# Patient Record
Sex: FEMALE | Race: WHITE | NOT HISPANIC OR LATINO | Employment: UNEMPLOYED | URBAN - METROPOLITAN AREA
[De-identification: names, ages, dates, MRNs, and addresses within clinical notes are randomized per-mention and may not be internally consistent; named-entity substitution may affect disease eponyms.]

---

## 2017-05-17 ENCOUNTER — INITIAL PRENATAL (OUTPATIENT)
Dept: OBSTETRICS AND GYNECOLOGY | Facility: CLINIC | Age: 27
End: 2017-05-17
Payer: OTHER GOVERNMENT

## 2017-05-17 VITALS
SYSTOLIC BLOOD PRESSURE: 126 MMHG | DIASTOLIC BLOOD PRESSURE: 65 MMHG | BODY MASS INDEX: 27.86 KG/M2 | WEIGHT: 141.88 LBS | HEIGHT: 60 IN

## 2017-05-17 DIAGNOSIS — O09.291 HISTORY OF GESTATIONAL DIABETES IN PRIOR PREGNANCY, CURRENTLY PREGNANT, FIRST TRIMESTER: ICD-10-CM

## 2017-05-17 DIAGNOSIS — O34.219 PREGNANCY WITH HISTORY OF CESAREAN SECTION, ANTEPARTUM: ICD-10-CM

## 2017-05-17 DIAGNOSIS — Z86.32 HISTORY OF GESTATIONAL DIABETES IN PRIOR PREGNANCY, CURRENTLY PREGNANT, FIRST TRIMESTER: ICD-10-CM

## 2017-05-17 DIAGNOSIS — Z3A.01 LESS THAN 8 WEEKS GESTATION OF PREGNANCY: Primary | ICD-10-CM

## 2017-05-17 PROBLEM — O09.299 HISTORY OF GESTATIONAL DIABETES IN PRIOR PREGNANCY, CURRENTLY PREGNANT: Status: ACTIVE | Noted: 2017-05-17

## 2017-05-17 PROCEDURE — 99202 OFFICE O/P NEW SF 15 MIN: CPT | Mod: PBBFAC | Performed by: ADVANCED PRACTICE MIDWIFE

## 2017-05-17 PROCEDURE — 99202 OFFICE O/P NEW SF 15 MIN: CPT | Mod: S$PBB,,, | Performed by: ADVANCED PRACTICE MIDWIFE

## 2017-05-17 PROCEDURE — 99999 PR PBB SHADOW E&M-NEW PATIENT-LVL II: CPT | Mod: PBBFAC,,, | Performed by: ADVANCED PRACTICE MIDWIFE

## 2017-05-17 RX ORDER — LORATADINE 10 MG/1
10 TABLET ORAL DAILY
COMMUNITY

## 2017-05-17 NOTE — PROGRESS NOTES
Cydney Dixon is a 26 y.o. , presents today for amenorrhea.      C/C: amenorrhea, possible pregnancy    HPI: Reports amenorrhea since Patient's last menstrual period was 2017 (exact date).. Prior to LMP, menses were regular occuring every 28 days prior.  Current contraception  None, planned pregnancy .    + UPT on (2017). does report nausea . does not report breast tenderness. does not report vaginal bleeding since LMP. does not report long-term chronic medical conditions     PAP HISTORY: last pap , result Normal, does not have a history of abnormal paps      SOCIAL HISTORY: denies emotional/mental/physical/sexual violence or abuse. does  Feel safe at home. Accompanied today by .    Review of patient's allergies indicates:   Allergen Reactions    Sulfa (sulfonamide antibiotics) Hives     Past Medical History:   Diagnosis Date    Diabetes mellitus     gestational diabetes, diet controlled     Past Surgical History:   Procedure Laterality Date     SECTION       Past Surgical History:   Procedure Laterality Date     SECTION       OB History    Para Term  AB SAB TAB Ectopic Multiple Living   2 1 1       2      # Outcome Date GA Lbr Yury/2nd Weight Sex Delivery Anes PTL Lv   2 Term 12 38w0d  2.863 kg (6 lb 5 oz) M CS-LTranv Gen  Y      Complications: Fetal Intolerance,Gestational diabetes   1                  OB History      Para Term  AB TAB SAB Ectopic Multiple Living    2 1 1       2        Social History     Social History    Marital status:      Spouse name: N/A    Number of children: N/A    Years of education: N/A     Occupational History    Not on file.     Social History Main Topics    Smoking status: Never Smoker    Smokeless tobacco: Not on file    Alcohol use No      Comment: socially before pregnancy    Drug use: No    Sexual activity: Yes     Partners: Male     Birth control/ protection: None      Other Topics Concern    Not on file     Social History Narrative    No narrative on file     Family History   Problem Relation Age of Onset    Alzheimer's disease Paternal Grandmother     Stroke Maternal Grandfather     Diabetes Maternal Grandfather      History   Sexual Activity    Sexual activity: Yes    Partners: Male    Birth control/ protection: None       GENETIC SCREENING   Patient's age 35 years or older as of estimated date of delivery? No  Nural tube defect (meningomyelocele, spina bifida, or anencephaly)? No  Down syndrome? No  Juan-Sachs (Ashkenazi Anglican, Cajun, Cambodian [place])? No  Canavan disease (Ashkenazi Anglican)? No  Familial dysautonomia (Ashkenazi Anglican)? No  Sickle cell disease or trait ()? No  Hemophilia or other blood disorders? No  Cystic fibrosis? No  Muscular dystrophy? No  [place]'s chorea? No  Thalassemia (Italian, Greek, Mediterranean, or  background) MCV less than 80? No  Congenital heart defect? No  Mental retardation/autism? No   If Yes, was person tested for Fragile X? No  Other inherited genetic or chromosomal disorder? No  Maternal metabolic disorder (e.g. type 1 diabetes, PKU)? No  Patient or baby's father had a child with birth defects not listed above? No  Recurrent pregnancy loss or a stillbirth: No  Medications (including supplements, vitamins, herbs or OTC drugs)/illicit/recreational drugs/alcohol since last menstrual period? Claritin   If yes, agent(s) and strength/dose: prn  List any other genetic risks:   Comments/counseling:     INFECTION HISTORY  Live with someone with TB or exposed to TB: No  Patient or partner has history of genital herpes: No  Rash or viral illness since last menstrual period: No  Patient or partner has hepatitis B or C: No  History of STD, gonorrhea, chlamydia, HPV, HIV, syphilis (list all that apply): No  List other infections: No  Additional comments:     Primary Doctor No     ROS: Constitutional/Gen: Denies fevers,  chills, malaise, or weight loss. denies fatigue   Psych: Denies depression, anxiety  Eyes: Denies changes in vision or scotomata  Ears, nose, mouth, throat: Denies sinus tenderness, swelling, or dentition problems  CV/vasc: Denies heart palpitations or edema  Resp: Denies SOB or dyspnea  Breasts: Denies mass, nipple discharge, or trauma.  denies breast tenderness.  GI: pos mild constipation, diarrhea, no  vomiting.pos  nausea.  : Denies vaginal discharge, dysuria or pelvic pain.  pos urinary frequency  MS: Denies weakness, soreness, or changes in ROM    OBJECTIVE:  /65  Ht 5' (1.524 m)  Wt 64.4 kg (141 lb 13.9 oz)  LMP 2017 (Exact Date) Comment: 28 day cycle   BMI 27.71 kg/m2  Constitutional/Gen: NAD, appears stated age, oriented x 3  Neurologic: A&O x 4  Psych: affect appropriate and without signs of mood, thought or memory difficulty appreciated    UPT positive in office    ASSESSMENT:  26 y.o. female  with amenorrhea  Pregnancy confirmed  Likely at 7 w3d via LMP;Body mass index is 27.71 kg/(m^2).  Patient Active Problem List   Diagnosis    Pregnancy with history of  section, antepartum       PLAN:  7w3d  1. Amenorrhea  -- + UPT in office, Patient's last menstrual period was 2017 (exact date). --> Estimated Date of Delivery: 17  -- Dating US ordered  -- Anatomical US ordered  -- Routine  prenatal labs ordered    2. Body mass index is 27.71 kg/(m^2).  -- Discussed IOM recommended weight gain of:   Underweight Less than 18.5 28-40    Normal Weight 18.5-24.9  25-35    Overweight 25-29.9  15-25    Obese   30 and greater  11-20   -- Discussed criteria for delivery at [facility] r/t excessive pre-preg weight or excessive weight gain:   Pre-pregnancy BMI over 40 or excess pregnancy weight gain defined as:   Pre-preg BMI < 18.5; Excess weight gain = > 60 pound   Pre-preg BMI 18.5-24.9;  Excess weight gain = > 53 pounds   Pre-preg BMI 25-29.9;  Excess weight gain = > 38  pounds   Pre-preg BMI > 30;  Excess weight gain = > 30 pounds    3. Discussed nausea and vomiting in pregnancy  -- Education regarding lifestyle and dietary modifications  -- Reviewed use of B6/Unisom prn. Pt will notify us if no relief/worsening symptoms, will consider alternative therapies prn    4. Pregnancy education and counseling; handouts and booklet provided  -- Oriented to practice and anticipated prenatal course of care and how to contact us  -- Precautions/warning signs reviewed  -- Common complaints of pregnancy  -- Routine prenatal labs including HIV  -- Ultrasounds  -- Childbirth education/hospital/[facility] facilities  -- Nutrition, prepregnant BMI, and recommended weight gain  -- Toxoplasmosis precautions (Cats/Raw Meat)  -- Sexual activity and exercise  -- Environmental/Work hazards  -- Travel  -- Tobacco (Ask, Advise, Assess, Assist, and Arrange), as well as alcohol and drug use  -- Use of any medications (Including supplements, Vitamins, Herbs, or OTC Drugs)  -- Domestic violence screen neg    5. Reviewed genetic testing options. Reviewed available first trimester and/or second   trimester screening options. Reviewed risk of false positive/negative results and recommendation of referral to [facility] in event of a positive result, for NIPT, US, and/or amniocentesis. Reviewed the possible estimated 1 in 300/500 risk of miscarriage with amniocentesis, even with a healthy fetus. Patient does not desire genetic screening.     6. Hx previous c/s desires TOLAC: discussed expected plan of care  Hx diet controlled gestational diabetes with last pregnancy.  Early one hour ordered.    7. Reviewed warning signs, precautions, and how/when to contact us.       8. RTC 4 weeks, call or present sooner prn.     Updated Medication List:  Current Outpatient Prescriptions   Medication Sig Dispense Refill    loratadine (CLARITIN) 10 mg tablet Take 10 mg by mouth once daily.       No current facility-administered medications for  this visit.

## 2017-06-02 ENCOUNTER — PROCEDURE VISIT (OUTPATIENT)
Dept: OBSTETRICS AND GYNECOLOGY | Facility: CLINIC | Age: 27
End: 2017-06-02
Payer: OTHER GOVERNMENT

## 2017-06-02 DIAGNOSIS — Z36.89 ENCOUNTER TO ESTABLISH GESTATIONAL AGE USING ULTRASOUND: ICD-10-CM

## 2017-06-02 DIAGNOSIS — Z3A.01 LESS THAN 8 WEEKS GESTATION OF PREGNANCY: ICD-10-CM

## 2017-06-02 PROCEDURE — 76801 OB US < 14 WKS SINGLE FETUS: CPT | Mod: 26,S$PBB,, | Performed by: OBSTETRICS & GYNECOLOGY

## 2017-06-02 PROCEDURE — 76801 OB US < 14 WKS SINGLE FETUS: CPT | Mod: PBBFAC | Performed by: OBSTETRICS & GYNECOLOGY

## 2017-06-03 NOTE — PROCEDURES
Procedures     Indication  ========    Estimation of gestational age.    Method  ======    Transvaginal ultrasound examination. View: Sufficient.    Pregnancy  =========    Leigh pregnancy. Number of fetuses: 1.    Dating  ======    LMP on: 3/26/2017  GA by LMP 9 w + 5 d  AISF by LMP: 12/31/2017  Ultrasound examination on: 6/2/2017  GA by U/S based upon: CRL  GA by U/S 9 w + 4 d  ASIF by U/S: 1/1/2018  Assigned: Dating performed on 06/2/2017, based on the LMP  Assigned GA 9 w + 5 d  Assigned ASIF: 12/31/2017    Assessment  ==========    Gestational sac: visualized  Yolk sac: visualized  Embryo: visualized  CRL 27.4 mm 9w 4d Hadlock  Cardiac activity: present   bpm    Maternal Structures  ===============    Uterus / Cervix  Uterus: Normal  Ovaries / Tubes / Adnexa  Rt ovary: Normal  Rt ovary D1 2.4 cm  Rt ovary D2 2.1 cm  Rt ovary D3 2.3 cm  Rt ovary mean 2.3 cm  Rt ovary vol 6.2 cm³  Rt ovarian corpus luteum D1 22.0 mm  Rt ovarian corpus luteum D2 15.0 mm  Rt ovarian corpus luteum D3 17.0 mm  Lt ovary: Normal  Lt ovary D1 1.5 cm  Lt ovary D2 1.2 cm  Lt ovary D3 1.9 cm  Lt ovary mean 1.5 cm  Lt ovary vol 1.8 cm³  Pouch of Jose / Other Structures  Free fluid: No free fluid visualized    Impression  =========    Single viable intrauterine pregnancy consistent with LMP dating.  Embryo grossly WNL with normal cardiac activity.  Normal uterus, cervix and adnexae as noted above.  No fluid seen in cul-de-sac.    Recommendation  ==============    We recommend repeat evaluation for fetal growth and anatomy survey at 18-20 weeks.

## 2017-06-19 ENCOUNTER — INITIAL PRENATAL (OUTPATIENT)
Dept: OBSTETRICS AND GYNECOLOGY | Facility: CLINIC | Age: 27
End: 2017-06-19
Payer: OTHER GOVERNMENT

## 2017-06-19 VITALS — SYSTOLIC BLOOD PRESSURE: 98 MMHG | BODY MASS INDEX: 27.92 KG/M2 | DIASTOLIC BLOOD PRESSURE: 60 MMHG | WEIGHT: 142.94 LBS

## 2017-06-19 DIAGNOSIS — Z3A.12 12 WEEKS GESTATION OF PREGNANCY: ICD-10-CM

## 2017-06-19 DIAGNOSIS — Z34.91 PRENATAL CARE IN FIRST TRIMESTER: Primary | ICD-10-CM

## 2017-06-19 DIAGNOSIS — O34.219 PREGNANCY WITH HISTORY OF CESAREAN SECTION, ANTEPARTUM: ICD-10-CM

## 2017-06-19 PROCEDURE — 99999 PR PBB SHADOW E&M-EST. PATIENT-LVL II: CPT | Mod: PBBFAC,,, | Performed by: ADVANCED PRACTICE MIDWIFE

## 2017-06-19 PROCEDURE — 99212 OFFICE O/P EST SF 10 MIN: CPT | Mod: PBBFAC | Performed by: ADVANCED PRACTICE MIDWIFE

## 2017-06-19 PROCEDURE — 87591 N.GONORRHOEAE DNA AMP PROB: CPT

## 2017-06-19 PROCEDURE — 88175 CYTOPATH C/V AUTO FLUID REDO: CPT

## 2017-06-19 PROCEDURE — 87086 URINE CULTURE/COLONY COUNT: CPT

## 2017-06-19 PROCEDURE — 0500F INITIAL PRENATAL CARE VISIT: CPT | Mod: ,,, | Performed by: ADVANCED PRACTICE MIDWIFE

## 2017-06-19 NOTE — PROGRESS NOTES
27 y.o.  at 12w1d  Doing well, some acid reflux and sciatica   TW lbs , diet and exercise reviewed  No vaginal bleeding, no pain  Reviewed prenatal labs not done yet, will have done with early one hour  1st trimester genetic testing, pt requests cell free maternal serum screening as had done with last pregnancy.  [id number] pamphlet given to pt.  Reviewed with pt that she is not considered in a high risk group but she still states that her insurance will cover it and she desires it.  Will refer to [facility] for ordering.  PE today:  External genitalia: no lesions or discharge, normal hair distribution  Urethral meatus: normal size and location, no lesions or prolapse  Vagina: normal appearance, no lesions, no discharge,  Cervix: normal appearance, no discharge, no lesions, negative CMT  Uterus: nontender, mobile, approx 12 week size, normal contour and position. FHTs negative  Via doppler  Adnexa: no masses or tenderness  Anus/Perineum: normal appearance, with no lesions or discharge. Internal exam deferred.    Pap, gc/ct and urine culture today  Reviewed warning signs, pregnancy precautions and how/when to call.  RTC 4 wks, call or present sooner prn.

## 2017-06-20 ENCOUNTER — TELEPHONE (OUTPATIENT)
Dept: MATERNAL FETAL MEDICINE | Facility: CLINIC | Age: 27
End: 2017-06-20

## 2017-06-20 LAB
C TRACH DNA SPEC QL NAA+PROBE: NOT DETECTED
N GONORRHOEA DNA SPEC QL NAA+PROBE: NOT DETECTED

## 2017-06-21 LAB — BACTERIA UR CULT: NORMAL

## 2017-06-29 ENCOUNTER — LAB VISIT (OUTPATIENT)
Dept: LAB | Facility: OTHER | Age: 27
End: 2017-06-29
Attending: ADVANCED PRACTICE MIDWIFE
Payer: OTHER GOVERNMENT

## 2017-06-29 DIAGNOSIS — Z3A.01 LESS THAN 8 WEEKS GESTATION OF PREGNANCY: ICD-10-CM

## 2017-06-29 DIAGNOSIS — O09.291 HISTORY OF GESTATIONAL DIABETES IN PRIOR PREGNANCY, CURRENTLY PREGNANT, FIRST TRIMESTER: ICD-10-CM

## 2017-06-29 DIAGNOSIS — Z86.32 HISTORY OF GESTATIONAL DIABETES IN PRIOR PREGNANCY, CURRENTLY PREGNANT, FIRST TRIMESTER: ICD-10-CM

## 2017-06-29 LAB
ABO + RH BLD: NORMAL
BASOPHILS # BLD AUTO: 0.01 K/UL
BASOPHILS NFR BLD: 0.1 %
BLD GP AB SCN CELLS X3 SERPL QL: NORMAL
DIFFERENTIAL METHOD: NORMAL
EOSINOPHIL # BLD AUTO: 0.1 K/UL
EOSINOPHIL NFR BLD: 1.1 %
ERYTHROCYTE [DISTWIDTH] IN BLOOD BY AUTOMATED COUNT: 13.1 %
GLUCOSE SERPL-MCNC: 93 MG/DL
HBV SURFACE AG SERPL QL IA: NEGATIVE
HCT VFR BLD AUTO: 37.6 %
HGB BLD-MCNC: 12.9 G/DL
HIV 1+2 AB+HIV1 P24 AG SERPL QL IA: NEGATIVE
LYMPHOCYTES # BLD AUTO: 1.6 K/UL
LYMPHOCYTES NFR BLD: 19.4 %
MCH RBC QN AUTO: 30.6 PG
MCHC RBC AUTO-ENTMCNC: 34.3 %
MCV RBC AUTO: 89 FL
MONOCYTES # BLD AUTO: 0.5 K/UL
MONOCYTES NFR BLD: 6.4 %
NEUTROPHILS # BLD AUTO: 6.1 K/UL
NEUTROPHILS NFR BLD: 72.8 %
PLATELET # BLD AUTO: 211 K/UL
PMV BLD AUTO: 10.6 FL
RBC # BLD AUTO: 4.22 M/UL
RPR SER QL: NORMAL
WBC # BLD AUTO: 8.39 K/UL

## 2017-06-29 PROCEDURE — 86762 RUBELLA ANTIBODY: CPT

## 2017-06-29 PROCEDURE — 86900 BLOOD TYPING SEROLOGIC ABO: CPT

## 2017-06-29 PROCEDURE — 86703 HIV-1/HIV-2 1 RESULT ANTBDY: CPT

## 2017-06-29 PROCEDURE — 85025 COMPLETE CBC W/AUTO DIFF WBC: CPT

## 2017-06-29 PROCEDURE — 82950 GLUCOSE TEST: CPT

## 2017-06-29 PROCEDURE — 86901 BLOOD TYPING SEROLOGIC RH(D): CPT

## 2017-06-29 PROCEDURE — 86592 SYPHILIS TEST NON-TREP QUAL: CPT

## 2017-06-29 PROCEDURE — 87340 HEPATITIS B SURFACE AG IA: CPT

## 2017-06-29 PROCEDURE — 36415 COLL VENOUS BLD VENIPUNCTURE: CPT

## 2017-06-30 LAB
RUBV IGG SER-ACNC: 25.6 IU/ML
RUBV IGG SER-IMP: REACTIVE

## 2017-07-03 ENCOUNTER — HOSPITAL ENCOUNTER (EMERGENCY)
Facility: OTHER | Age: 27
Discharge: HOME | End: 2017-07-03
Attending: EMERGENCY MEDICINE
Payer: OTHER GOVERNMENT

## 2017-07-03 ENCOUNTER — NURSE TRIAGE (OUTPATIENT)
Dept: ADMINISTRATIVE | Facility: CLINIC | Age: 27
End: 2017-07-03

## 2017-07-03 ENCOUNTER — TELEPHONE (OUTPATIENT)
Dept: OBSTETRICS AND GYNECOLOGY | Facility: CLINIC | Age: 27
End: 2017-07-03

## 2017-07-03 VITALS
RESPIRATION RATE: 14 BRPM | WEIGHT: 140 LBS | TEMPERATURE: 98 F | BODY MASS INDEX: 27.48 KG/M2 | HEIGHT: 60 IN | SYSTOLIC BLOOD PRESSURE: 102 MMHG | DIASTOLIC BLOOD PRESSURE: 61 MMHG | HEART RATE: 79 BPM | OXYGEN SATURATION: 100 %

## 2017-07-03 DIAGNOSIS — S39.012A BACK STRAIN, INITIAL ENCOUNTER: ICD-10-CM

## 2017-07-03 DIAGNOSIS — O26.892 ABDOMINAL PAIN IN PREGNANCY, SECOND TRIMESTER: ICD-10-CM

## 2017-07-03 DIAGNOSIS — S80.02XA CONTUSION OF LEFT KNEE, INITIAL ENCOUNTER: Primary | ICD-10-CM

## 2017-07-03 DIAGNOSIS — R10.9 ABDOMINAL PAIN IN PREGNANCY, SECOND TRIMESTER: ICD-10-CM

## 2017-07-03 LAB
B-HCG UR QL: POSITIVE
BILIRUB UR QL STRIP: NEGATIVE
CLARITY UR: CLEAR
COLOR UR: YELLOW
CTP QC/QA: YES
GLUCOSE UR QL STRIP: NEGATIVE
HGB UR QL STRIP: NEGATIVE
KETONES UR QL STRIP: NEGATIVE
LEUKOCYTE ESTERASE UR QL STRIP: NEGATIVE
NITRITE UR QL STRIP: NEGATIVE
PH UR STRIP: 8 [PH] (ref 5–8)
PROT UR QL STRIP: NEGATIVE
SP GR UR STRIP: 1.01 (ref 1–1.03)
URN SPEC COLLECT METH UR: NORMAL
UROBILINOGEN UR STRIP-ACNC: NEGATIVE EU/DL

## 2017-07-03 PROCEDURE — 99283 EMERGENCY DEPT VISIT LOW MDM: CPT | Mod: 25

## 2017-07-03 PROCEDURE — 81003 URINALYSIS AUTO W/O SCOPE: CPT

## 2017-07-03 PROCEDURE — 81025 URINE PREGNANCY TEST: CPT | Performed by: PHYSICIAN ASSISTANT

## 2017-07-03 NOTE — TELEPHONE ENCOUNTER
Returned pt phone call.  Pt in waiting area in ER now, was told by triage nurse to go to ER if did not hear back from us by end of office hours.  Did have a fall last Friday although did not hit abdomen.  Since then has experienced progressively worsening cramping.  No bleeding.  No urinary sx.  Advised to go ahead and be seen by ER and to f/u with us as needed.

## 2017-07-03 NOTE — TELEPHONE ENCOUNTER
Returned pt phone call, no answer.  Left message asking pt to call back to after hours number especially if continues to feel pain or has any bleeding.

## 2017-07-03 NOTE — TELEPHONE ENCOUNTER
Pt has questions about pain she has been having since yesterday- she is unsure if it could just be ligament pain or if it is something she should be seen for since she had a hard fall Friday when she landed on her knees. Started on Sunday with intermittent lower abdominal pain( like menstrual cramping) lasting for 30-60 min then will resolve for about 2 hrs then return. Denied any bleeding/discharge.     Reason for Disposition   Intermittent lower abdominal pain lasting > 24 hours    Protocols used: ST PREGNANCY - ABDOMINAL PAIN LESS THAN 20 WEEKS EGA-A-OH    Advised I would send message to office requesting call back and advice. Pt instructed on ER if sx's worsening before speaking with someone from office.

## 2017-07-03 NOTE — ED TRIAGE NOTES
Patient is 14 weeks pregnant and fell several days ago after stepping on a toy at home.  Patient c/o lower back pain and lower abdominal pain.  Denies vaginal bleeding.  Patient in for evaluation.

## 2017-07-04 NOTE — ED PROVIDER NOTES
Encounter Date: 7/3/2017       History     Chief Complaint   Patient presents with    Fall     x3 days ago, subsequent lower back and lower abd pain. 14 wk pregnant approx,      27-year-old female presents to the ER for evaluation of low back pain and abdominal cramping ×3 days.  The patient is 14 weeks pregnant and is followed by our OB/GYN clinic.  The patient states that she tripped over her child's toy 3 days ago.  Her right leg slipped from underneath her and she fell directly on her left knee.  She did not fall on her abdomen or her back.  The patient was not experiencing any pain until the following day.  She reports mild low back pain and bilateral lower abdominal cramping pain beginning 2 days ago.  Her pain worsened today and she was advised by her OB/GYN clinic today to come to the ER for evaluation.  The patient has no current pain.  She says her abdominal cramping and back pain is intermittent.  Abdominal cramping is rated 5/10 when present and usually lasts for 30 minutes to an hour.  She has not taken any pain medications.  Her pain is unchanged with movements.  She denies nausea, vomiting, diarrhea, dysuria, vaginal bleeding or discharge, chest pain, shortness of breath or additional complaints at this time.          Review of patient's allergies indicates:   Allergen Reactions    Sulfa (sulfonamide antibiotics) Hives     Past Medical History:   Diagnosis Date    Diabetes mellitus     gestational diabetes, diet controlled     Past Surgical History:   Procedure Laterality Date     SECTION       Family History   Problem Relation Age of Onset    Alzheimer's disease Paternal Grandmother     Stroke Maternal Grandfather     Diabetes Maternal Grandfather      Social History   Substance Use Topics    Smoking status: Never Smoker    Smokeless tobacco: Not on file    Alcohol use No      Comment: socially before pregnancy     Review of Systems   Constitutional: Negative for chills and  fever.   HENT: Negative for sore throat.    Respiratory: Negative for shortness of breath.    Cardiovascular: Negative for chest pain.   Gastrointestinal: Positive for abdominal pain. Negative for nausea and vomiting.        Bedside ultrasound - fetal heart tones = 140, fetal movements observed   Genitourinary: Negative for dysuria, pelvic pain, vaginal bleeding and vaginal discharge.   Musculoskeletal: Positive for back pain.   Skin: Positive for wound (bruising left knee.  no abrasion). Negative for rash.   Neurological: Negative for dizziness, syncope, weakness and light-headedness.   Hematological: Does not bruise/bleed easily.       Physical Exam     Initial Vitals [07/03/17 1638]   BP Pulse Resp Temp SpO2   101/70 77 16 98.2 °F (36.8 °C) 98 %      MAP       80.33         Physical Exam    Constitutional: She appears well-developed and well-nourished.   HENT:   Head: Atraumatic.   Mouth/Throat: Oropharynx is clear and moist.   Eyes: Conjunctivae and EOM are normal. Pupils are equal, round, and reactive to light.   Neck: Normal range of motion. Neck supple.   Cardiovascular: Normal rate, regular rhythm and intact distal pulses.   Pulmonary/Chest: Breath sounds normal. No respiratory distress. She has no wheezes. She has no rhonchi. She has no rales.   Abdominal: Soft. Bowel sounds are normal. She exhibits no distension. There is no tenderness. There is no rebound and no guarding.   Bedside ultrasound - fetal heart tones = 140, fetal movements observed.    Musculoskeletal:        Left knee: She exhibits ecchymosis (light bruising to anterior knee). She exhibits normal range of motion, no swelling, no laceration, no erythema, no LCL laxity, no bony tenderness and no MCL laxity. No tenderness found.        Lumbar back: She exhibits tenderness (bilateral paraspinous muscles ) and pain. She exhibits no bony tenderness.   Neurological: She is alert and oriented to person, place, and time. She has normal strength. No  cranial nerve deficit.   Skin: No rash noted.   Psychiatric: She has a normal mood and affect.         ED Course   Procedures  Labs Reviewed   POCT URINE PREGNANCY - Abnormal; Notable for the following:        Result Value    POC Preg Test, Ur Positive (*)     All other components within normal limits   URINALYSIS                   APC / Resident Notes:   Patient presents to the ER chief complaint of abdominal cramping and low back pain, which began the day after a slip and fall.  The patient did not fall directly on her abdomen.  She reports history of intermittent back pain, and her back pain today is most consistent with a musculoskeletal back strain.  She has paraspinous muscular tenderness.  No midline spinous process tenderness, no radicular pain or extremity weakness or numbness.  I do not see an indication for emergent imaging at this time.  Patient's bedside ultrasound shows normal fetal heart tones and movements.  She is not experiencing any vaginal bleeding.  UA is negative for evidence of infection.  She has no recent vomiting, diarrhea or signs of dehydration.  The cause of her abdominal cramping is not clear at this time, but her Abdominal exam is benign and I do not suspect intraabdominal injury or acute abdominal infection such as appendicitis, cholecystitis or pancreatitis.    She is stable for discharge, but is advised to contact her OB/GYN for follow up exam this week.  She is given return precautions.  Patient is comfortable with this plan.I discussed the care of this patient with my supervising MD.                ED Course     Clinical Impression:   The primary encounter diagnosis was Contusion of left knee, initial encounter. Diagnoses of Back strain, initial encounter and Abdominal pain in pregnancy, second trimester were also pertinent to this visit.                           LOKESH Armstrong  07/04/17 0118       LOKESH Armstrong  07/04/17 0121

## 2017-07-17 ENCOUNTER — ROUTINE PRENATAL (OUTPATIENT)
Dept: OBSTETRICS AND GYNECOLOGY | Facility: CLINIC | Age: 27
End: 2017-07-17
Payer: OTHER GOVERNMENT

## 2017-07-17 VITALS
WEIGHT: 141.44 LBS | SYSTOLIC BLOOD PRESSURE: 102 MMHG | BODY MASS INDEX: 27.62 KG/M2 | DIASTOLIC BLOOD PRESSURE: 58 MMHG

## 2017-07-17 DIAGNOSIS — Z3A.16 16 WEEKS GESTATION OF PREGNANCY: ICD-10-CM

## 2017-07-17 DIAGNOSIS — Z34.80 ENCOUNTER FOR SUPERVISION OF OTHER NORMAL PREGNANCY: Primary | ICD-10-CM

## 2017-07-17 PROCEDURE — 99211 OFF/OP EST MAY X REQ PHY/QHP: CPT | Mod: PBBFAC | Performed by: ADVANCED PRACTICE MIDWIFE

## 2017-07-17 PROCEDURE — 99213 OFFICE O/P EST LOW 20 MIN: CPT | Mod: S$PBB,,, | Performed by: ADVANCED PRACTICE MIDWIFE

## 2017-07-17 PROCEDURE — 99999 PR PBB SHADOW E&M-EST. PATIENT-LVL I: CPT | Mod: PBBFAC,,, | Performed by: ADVANCED PRACTICE MIDWIFE

## 2017-08-17 ENCOUNTER — OFFICE VISIT (OUTPATIENT)
Dept: MATERNAL FETAL MEDICINE | Facility: CLINIC | Age: 27
End: 2017-08-17
Payer: OTHER GOVERNMENT

## 2017-08-17 ENCOUNTER — ROUTINE PRENATAL (OUTPATIENT)
Dept: OBSTETRICS AND GYNECOLOGY | Facility: CLINIC | Age: 27
End: 2017-08-17
Attending: OBSTETRICS & GYNECOLOGY
Payer: OTHER GOVERNMENT

## 2017-08-17 VITALS
WEIGHT: 144.06 LBS | BODY MASS INDEX: 28.14 KG/M2 | DIASTOLIC BLOOD PRESSURE: 72 MMHG | SYSTOLIC BLOOD PRESSURE: 110 MMHG

## 2017-08-17 DIAGNOSIS — O34.219 PREGNANCY WITH HISTORY OF CESAREAN SECTION, ANTEPARTUM: ICD-10-CM

## 2017-08-17 DIAGNOSIS — Z34.92 PRENATAL CARE IN SECOND TRIMESTER: ICD-10-CM

## 2017-08-17 DIAGNOSIS — Z3A.01 LESS THAN 8 WEEKS GESTATION OF PREGNANCY: ICD-10-CM

## 2017-08-17 DIAGNOSIS — Z36.89 ENCOUNTER FOR FETAL ANATOMIC SURVEY: ICD-10-CM

## 2017-08-17 DIAGNOSIS — M54.30 SCIATIC NERVE PAIN, UNSPECIFIED LATERALITY: Primary | ICD-10-CM

## 2017-08-17 PROBLEM — Z3A.12 12 WEEKS GESTATION OF PREGNANCY: Status: RESOLVED | Noted: 2017-05-17 | Resolved: 2017-08-17

## 2017-08-17 PROCEDURE — 0502F SUBSEQUENT PRENATAL CARE: CPT | Mod: ,,, | Performed by: ADVANCED PRACTICE MIDWIFE

## 2017-08-17 PROCEDURE — 99999 PR PBB SHADOW E&M-EST. PATIENT-LVL III: CPT | Mod: PBBFAC,,, | Performed by: ADVANCED PRACTICE MIDWIFE

## 2017-08-17 PROCEDURE — 76805 OB US >/= 14 WKS SNGL FETUS: CPT | Mod: PBBFAC | Performed by: OBSTETRICS & GYNECOLOGY

## 2017-08-17 PROCEDURE — 99499 UNLISTED E&M SERVICE: CPT | Mod: S$PBB,,, | Performed by: OBSTETRICS & GYNECOLOGY

## 2017-08-17 PROCEDURE — 76805 OB US >/= 14 WKS SNGL FETUS: CPT | Mod: 26,S$PBB,, | Performed by: OBSTETRICS & GYNECOLOGY

## 2017-08-17 NOTE — PROGRESS NOTES
OB Ultrasound Report (see PDF version under imaging tab)    Indication  ========    Fetal anatomy survey.    History  ======    Previous Outcomes   2  Para 1    Pregnancy History  ===============    Maternal Lab Tests  Genetic screening declined.      Method  ======    Transabdominal ultrasound examination. View: Suboptimal view: limited by fetal position.    Pregnancy  =========    Leigh pregnancy. Number of fetuses: 1.    Dating  ======    Ultrasound examination on: 2017  GA by U/S based upon: AC, BPD, Femur, HC  GA by U/S 19 w + 5 d  ASIF by U/S: 2018  Assigned: Dating performed on 2017, based on the LMP  Assigned GA 20 w + 4 d  Assigned ASIF: 2017    General Evaluation  ===============    Cardiac activity: present.  bpm.  Fetal movements: visualized.  Presentation: breech.  Placenta:  Placental site: posterior.  Umbilical cord: Cord vessels: 3 vessel cord. Cord insertion: placental insertion: normal.  Amniotic fluid: Amount of AF: normal amount.    Fetal Biometry  ===========    Fetal Biometry  BPD 42.2 mm 18w 5d Hadlock  OFD 61.4 mm 21w 1d Kristie  .3 mm 19w 3d Hadlock  .3 mm 20w 2d Hadlock  Femur 32.9 mm 20w 2d Hadlock  Cerebellum tr 20.5 mm 20w 2d Contreras  CM 3.7 mm  Humerus 33.7 mm 21w 3d Kristie   g 19% Jame  Calculated by: Hadlock (BPD-HC-AC-FL)  EFW (lb) 0 lb  EFW (oz) 12 oz  Cephalic index 0.69  HC / AC 1.11  FL / BPD 0.78  FL / AC 0.22   bpm  Head / Face / Neck   6.0 mm    Fetal Anatomy  ===========    Cranium: normal  Lateral ventricles: normal  Choroid plexus: normal  Midline falx: normal  Cavum septi pellucidi: normal  Cerebellum: normal  Cisterna magna: normal  Lips: normal  Profile: normal  Nose: normal  4-chamber view: suboptimal  RVOT: suboptimal  LVOT: suboptimal  4-chamber view: 4-chamber normal, septum suboptimal  Situs: normal  Aortic arch: normal  Ductal arch: suboptimal  3-vessel view: suboptimal  Cord  insertion: normal  Stomach: normal  Kidneys: normal  Bladder: normal  Genitals: normal  Cervical spine: normal  Thoracic spine: suboptimal  Lumbar spine: suboptimal  Sacral spine: normal  Arms: both visualized  Legs: both visualized  Rt upper arm: normal  Rt forearm: normal  Rt hand: visualized  Rt hand: open  Lt upper arm: normal  Lt forearm: normal  Lt hand: visualized  Lt hand: open  Rt upper leg: normal  Rt lower leg: normal  Rt foot: normal  Lt upper leg: normal  Lt lower leg: normal  Lt foot: normal  Position of feet: normal  Gender: female  Wants to know gender: yes    Maternal Structures  ===============    Uterus / Cervix  Cervical length 41.9 mm  Ovaries / Tubes / Adnexa  Rt ovary: Visualized  Lt ovary: Visualized    Impression  =========    A holland living IUP is identified. Fetal size is appropriate for established dating. No fetal structural malformations are identified;  however, the anatomic survey is incomplete as noted above. The patient will be scheduled for a f/u exam to complete the anatomic  survey. Cervical length is normal, and placental location is normal without evidence of previa.

## 2017-08-17 NOTE — PROGRESS NOTES
27 y.o. female  at 20w4d by LMP c/w 9 wk US  Starting to feel flutters/FM, denies VB, LOF or cramping  Requests referral to PT for sciatica discomfort - referral placed  Has anatomy US today  Declines genetic testing   Hx CS  -- Desires TOLAC. Will bring to next CNM/MD meeting  -- NRFS at 8cm; OP report in media section of EPIC - LTCS with 2 layer uterine closure  -- Understands TOLAC policies and is agreeable to them  Hx diet controlled GDM  -- Passed early 1hr this pregnancy  -- Understands need for repeat 1hr at ~ 28 wks  Reviewed warning signs, normal FM,  labor precautions and how/when to call.  RTC x 4 wks, call or present sooner prn.

## 2017-09-14 ENCOUNTER — DOCUMENTATION ONLY (OUTPATIENT)
Dept: OBSTETRICS AND GYNECOLOGY | Facility: CLINIC | Age: 27
End: 2017-09-14

## 2017-09-14 ENCOUNTER — OFFICE VISIT (OUTPATIENT)
Dept: MATERNAL FETAL MEDICINE | Facility: CLINIC | Age: 27
End: 2017-09-14
Payer: OTHER GOVERNMENT

## 2017-09-14 ENCOUNTER — ROUTINE PRENATAL (OUTPATIENT)
Dept: OBSTETRICS AND GYNECOLOGY | Facility: CLINIC | Age: 27
End: 2017-09-14
Payer: OTHER GOVERNMENT

## 2017-09-14 VITALS — BODY MASS INDEX: 28.8 KG/M2 | WEIGHT: 147.5 LBS | DIASTOLIC BLOOD PRESSURE: 62 MMHG | SYSTOLIC BLOOD PRESSURE: 100 MMHG

## 2017-09-14 DIAGNOSIS — O09.292 HISTORY OF GESTATIONAL DIABETES IN PRIOR PREGNANCY, CURRENTLY PREGNANT, SECOND TRIMESTER: ICD-10-CM

## 2017-09-14 DIAGNOSIS — Z34.92 PRENATAL CARE IN SECOND TRIMESTER: Primary | ICD-10-CM

## 2017-09-14 DIAGNOSIS — O34.219 PREGNANCY WITH HISTORY OF CESAREAN SECTION, ANTEPARTUM: ICD-10-CM

## 2017-09-14 DIAGNOSIS — Z86.32 HISTORY OF GESTATIONAL DIABETES IN PRIOR PREGNANCY, CURRENTLY PREGNANT, SECOND TRIMESTER: ICD-10-CM

## 2017-09-14 PROCEDURE — 99999 PR PBB SHADOW E&M-EST. PATIENT-LVL II: CPT | Mod: PBBFAC,,, | Performed by: ADVANCED PRACTICE MIDWIFE

## 2017-09-14 PROCEDURE — 0502F SUBSEQUENT PRENATAL CARE: CPT | Mod: ,,, | Performed by: ADVANCED PRACTICE MIDWIFE

## 2017-09-14 PROCEDURE — 99499 UNLISTED E&M SERVICE: CPT | Mod: S$PBB,,, | Performed by: OBSTETRICS & GYNECOLOGY

## 2017-09-14 PROCEDURE — 76816 OB US FOLLOW-UP PER FETUS: CPT | Mod: 26,S$PBB,, | Performed by: OBSTETRICS & GYNECOLOGY

## 2017-09-14 PROCEDURE — 76816 OB US FOLLOW-UP PER FETUS: CPT | Mod: PBBFAC | Performed by: OBSTETRICS & GYNECOLOGY

## 2017-09-14 PROCEDURE — 99212 OFFICE O/P EST SF 10 MIN: CPT | Mod: PBBFAC | Performed by: ADVANCED PRACTICE MIDWIFE

## 2017-09-14 NOTE — PROGRESS NOTES
S&S PTL reviewed.  28 week lab work ordered.  Has follow up ultrasound today.  Childbirth education discussed. Had an epidural late into her first labor. Is looking forward to using water as a comfort measure in labor. [employer]'s half day suggested.  Breastfeeding flip chart reviewed.  Flu shot and Tdap recommended and declined.

## 2017-09-14 NOTE — PROGRESS NOTES
Per CORBIN/MD mtg on 9/14/17    Cleared for TOLAC per Radu Gandhi CNM/NP  Certified Nurse Midwife/Nurse Practitioner  9/14/2017 2:32 PM

## 2017-09-14 NOTE — LETTER
September 14, 2017      Sonia Castrejon, CORBIN  [address]  [address]  [place] [address]           [facility]  [address]  [place] [address]  Phone: [phone]          Patient: Cydney Leonard   MR Number: [id number]   YOB: 1990   Date of Visit: 9/14/2017       Dear Sonia Castrejon:    Thank you for referring Cydney Leonard to me for evaluation. Attached you will find relevant portions of my assessment and plan of care.    If you have questions, please do not hesitate to call me. I look forward to following Cydney Leonard along with you.    Sincerely,    Rose Kat MD    Enclosure  CC:  No Recipients    If you would like to receive this communication electronically, please contact [email or web address] or [phone] to request more information on [employer] access.    For providers and/or their staff who would like to refer a patient to [facility], please contact us through our one-stop-shop provider referral line, [facility], at [phone].    If you feel you have received this communication in error or would no longer like to receive these types of communications, please e-mail [email or web address]

## 2017-09-15 NOTE — PROGRESS NOTES
Indication  ========    Evaluation of fetal growth.    History  ======    Previous Outcomes   2  Para 1    Pregnancy History  ==============    Maternal Lab Tests  Genetic screening declined.      Method  ======    Transabdominal ultrasound examination.    Pregnancy  =========    Leigh pregnancy. Number of fetuses: 1.    Dating  ======    Cycle: regular cycle  Ultrasound examination on: 2017  GA by U/S based upon: AC, BPD, Femur, HC  GA by U/S 23 w + 6 d  ASIF by U/S: 2018  Assigned: Dating performed on 2017, based on the LMP  Assigned GA 24 w + 4 d  Assigned ASIF: 2017    General Evaluation  ==============    Cardiac activity: present.  bpm.  Fetal movements: visualized.  Presentation: cephalic.  Placenta: posterior.  Umbilical cord: 3 vessel cord.  Amniotic fluid: MVP 5.1 cm.    Fetal Biometry  ============    Fetal Biometry  BPD 56.3 mm 23w 1d Hadlock  OFD 76.8 mm 25w 1d Kristie  .2 mm 23w 4d Hadlock  .8 mm 24w 2d Hadlock  Femur 44.5 mm 24w 5d Hadlock   g 37% Jame  Calculated by: Hadlock (BPD-HC-AC-FL)  EFW (lb) 1 lb  EFW (oz) 8 oz  Cephalic index 0.73  HC / AC 1.10  FL / BPD 0.79  FL / AC 0.23  MVP 5.1 cm   bpm  Head / Face / Neck   5.7 mm    Fetal Anatomy  ===========    Cranium: normal  Posterior fossa: normal  4-chamber view: normal  RVOT: normal  LVOT: normal  Heart / Thorax: septum normal  Situs: normal  3-vessel view: normal  Stomach: normal  Kidneys: normal  Bladder: normal  Cervical spine: normal  Thoracic spine: normal  Lumbar spine: normal  Sacral spine: normal  Arms: both visualized  Legs: both visualized  Gender: female  Wants to know gender: yes  Other: A full anatomy survey previously performed.    Impression  =========    Leigh live intrauterine pregnancy.  Overall fetal growth is normal.  Amniotic fluid volume is normal.  The fetal anatomy that was seen appeared normal.    Recommendation  ==============    Follow up  ultrasound as clinically indicated.

## 2017-10-11 ENCOUNTER — LAB VISIT (OUTPATIENT)
Dept: LAB | Facility: OTHER | Age: 27
End: 2017-10-11
Attending: ADVANCED PRACTICE MIDWIFE
Payer: OTHER GOVERNMENT

## 2017-10-11 ENCOUNTER — ROUTINE PRENATAL (OUTPATIENT)
Dept: OBSTETRICS AND GYNECOLOGY | Facility: CLINIC | Age: 27
End: 2017-10-11
Payer: OTHER GOVERNMENT

## 2017-10-11 VITALS — SYSTOLIC BLOOD PRESSURE: 98 MMHG | BODY MASS INDEX: 27.3 KG/M2 | DIASTOLIC BLOOD PRESSURE: 60 MMHG | WEIGHT: 139.75 LBS

## 2017-10-11 DIAGNOSIS — O34.219 PREGNANCY WITH HISTORY OF CESAREAN SECTION, ANTEPARTUM: ICD-10-CM

## 2017-10-11 DIAGNOSIS — Z34.92 PRENATAL CARE IN SECOND TRIMESTER: Primary | ICD-10-CM

## 2017-10-11 DIAGNOSIS — Z34.92 PRENATAL CARE IN SECOND TRIMESTER: ICD-10-CM

## 2017-10-11 LAB
BASOPHILS # BLD AUTO: 0.01 K/UL
BASOPHILS NFR BLD: 0.1 %
DIFFERENTIAL METHOD: ABNORMAL
EOSINOPHIL # BLD AUTO: 0.1 K/UL
EOSINOPHIL NFR BLD: 1 %
ERYTHROCYTE [DISTWIDTH] IN BLOOD BY AUTOMATED COUNT: 13.6 %
GLUCOSE SERPL-MCNC: 163 MG/DL
HCT VFR BLD AUTO: 38.6 %
HGB BLD-MCNC: 13 G/DL
LYMPHOCYTES # BLD AUTO: 1.4 K/UL
LYMPHOCYTES NFR BLD: 18.7 %
MCH RBC QN AUTO: 31.5 PG
MCHC RBC AUTO-ENTMCNC: 33.7 G/DL
MCV RBC AUTO: 94 FL
MONOCYTES # BLD AUTO: 0.3 K/UL
MONOCYTES NFR BLD: 4 %
NEUTROPHILS # BLD AUTO: 5.6 K/UL
NEUTROPHILS NFR BLD: 76.1 %
PLATELET # BLD AUTO: 175 K/UL
PMV BLD AUTO: 11.1 FL
RBC # BLD AUTO: 4.13 M/UL
WBC # BLD AUTO: 7.34 K/UL

## 2017-10-11 PROCEDURE — 99999 PR PBB SHADOW E&M-EST. PATIENT-LVL II: CPT | Mod: PBBFAC,,, | Performed by: ADVANCED PRACTICE MIDWIFE

## 2017-10-11 PROCEDURE — 85025 COMPLETE CBC W/AUTO DIFF WBC: CPT

## 2017-10-11 PROCEDURE — 0502F SUBSEQUENT PRENATAL CARE: CPT | Mod: ,,, | Performed by: ADVANCED PRACTICE MIDWIFE

## 2017-10-11 PROCEDURE — 99212 OFFICE O/P EST SF 10 MIN: CPT | Mod: PBBFAC | Performed by: ADVANCED PRACTICE MIDWIFE

## 2017-10-11 PROCEDURE — 82950 GLUCOSE TEST: CPT

## 2017-10-11 PROCEDURE — 36415 COLL VENOUS BLD VENIPUNCTURE: CPT

## 2017-10-11 NOTE — PROGRESS NOTES
Getting blood work done today.  S&S PTL reviewed in length.  8 pound weight loss in four weeks and TWG loss of 3.6 oz discussed. Denies nausea or vomiting. States she is exercising more regularly but not to an extreme. Is doing eleptical 30 3 x a week. Eating three meals and 1-2 snacks. Choosemyplate.gov recommended. To bring in several days of diet history and return for a weight check in 2 weeks.

## 2017-10-12 DIAGNOSIS — O09.299 HISTORY OF GESTATIONAL DIABETES IN PRIOR PREGNANCY, CURRENTLY PREGNANT: ICD-10-CM

## 2017-10-12 DIAGNOSIS — Z86.32 HISTORY OF GESTATIONAL DIABETES IN PRIOR PREGNANCY, CURRENTLY PREGNANT: ICD-10-CM

## 2017-10-16 ENCOUNTER — LAB VISIT (OUTPATIENT)
Dept: LAB | Facility: OTHER | Age: 27
End: 2017-10-16
Attending: ADVANCED PRACTICE MIDWIFE
Payer: OTHER GOVERNMENT

## 2017-10-16 DIAGNOSIS — O09.299 HISTORY OF GESTATIONAL DIABETES IN PRIOR PREGNANCY, CURRENTLY PREGNANT: ICD-10-CM

## 2017-10-16 DIAGNOSIS — Z86.32 HISTORY OF GESTATIONAL DIABETES IN PRIOR PREGNANCY, CURRENTLY PREGNANT: ICD-10-CM

## 2017-10-16 PROCEDURE — 36415 COLL VENOUS BLD VENIPUNCTURE: CPT

## 2017-10-16 PROCEDURE — 82951 GLUCOSE TOLERANCE TEST (GTT): CPT

## 2017-10-17 ENCOUNTER — DOCUMENTATION ONLY (OUTPATIENT)
Dept: OBSTETRICS AND GYNECOLOGY | Facility: CLINIC | Age: 27
End: 2017-10-17

## 2017-10-17 LAB
GLUCOSE SERPL-MCNC: 123 MG/DL
GLUCOSE SERPL-MCNC: 138 MG/DL
GLUCOSE SERPL-MCNC: 69 MG/DL
GLUCOSE SERPL-MCNC: 95 MG/DL

## 2017-10-19 ENCOUNTER — TELEPHONE (OUTPATIENT)
Dept: OBSTETRICS AND GYNECOLOGY | Facility: HOSPITAL | Age: 27
End: 2017-10-19

## 2017-10-19 NOTE — TELEPHONE ENCOUNTER
----- Message from Kanchan Montaño RN sent at 10/19/2017 10:22 AM CDT -----  Contact: ALONA BAR [[id number]]      ----- Message -----  From: Rupali Escalanteuel  Sent: 10/19/2017   8:38 AM  To: Lucía NICHOLAS Staff    _  1st Request  _  2nd Request  _  3rd Request        Who: ALONA BAR [[id number]]    Why: patient 29 wks states she got bite by something on her toe last night. The area is swollen and painful and she would like to know if she should come in to the clinic or go see her PCP. Please advise.     What Number to Call Back: [phone]    When to Expect a call back: (Before the end of the day)   -- if call after 3:00 call back will be tomorrow.

## 2017-10-26 ENCOUNTER — ROUTINE PRENATAL (OUTPATIENT)
Dept: OBSTETRICS AND GYNECOLOGY | Facility: CLINIC | Age: 27
End: 2017-10-26
Payer: OTHER GOVERNMENT

## 2017-10-26 VITALS
BODY MASS INDEX: 27.47 KG/M2 | WEIGHT: 140.63 LBS | SYSTOLIC BLOOD PRESSURE: 104 MMHG | DIASTOLIC BLOOD PRESSURE: 70 MMHG

## 2017-10-26 DIAGNOSIS — Z86.32 HISTORY OF GESTATIONAL DIABETES IN PRIOR PREGNANCY, CURRENTLY PREGNANT: ICD-10-CM

## 2017-10-26 DIAGNOSIS — Z34.93 PRENATAL CARE IN THIRD TRIMESTER: Primary | ICD-10-CM

## 2017-10-26 DIAGNOSIS — O34.219 PREGNANCY WITH HISTORY OF CESAREAN SECTION, ANTEPARTUM: ICD-10-CM

## 2017-10-26 DIAGNOSIS — O09.299 HISTORY OF GESTATIONAL DIABETES IN PRIOR PREGNANCY, CURRENTLY PREGNANT: ICD-10-CM

## 2017-10-26 PROCEDURE — 99211 OFF/OP EST MAY X REQ PHY/QHP: CPT | Mod: PBBFAC | Performed by: ADVANCED PRACTICE MIDWIFE

## 2017-10-26 PROCEDURE — 0502F SUBSEQUENT PRENATAL CARE: CPT | Mod: ,,, | Performed by: ADVANCED PRACTICE MIDWIFE

## 2017-10-26 PROCEDURE — 99999 PR PBB SHADOW E&M-EST. PATIENT-LVL I: CPT | Mod: PBBFAC,,, | Performed by: ADVANCED PRACTICE MIDWIFE

## 2017-10-26 NOTE — PROGRESS NOTES
27 y.o. female  at 30w4d by LMP c/w 9wk US  Reports + FM, denies VB, LOF or CTX  Doing well, brings Michele to visit today  Hx CS, desires TOLAC  -- Previously cleared  -- Reviewed policies, she is already aware  Reviewed 28wk lab results (O POS) ; elevated 1hr, passed 3hr  Declines Tdap and flu vaccines  Reviewed warning signs, normal FKCs,  labor precautions and how/when to call.  RTC x 2 wks, call or present sooner prn.

## 2017-11-08 ENCOUNTER — ROUTINE PRENATAL (OUTPATIENT)
Dept: OBSTETRICS AND GYNECOLOGY | Facility: CLINIC | Age: 27
End: 2017-11-08
Payer: OTHER GOVERNMENT

## 2017-11-08 VITALS — BODY MASS INDEX: 27.4 KG/M2 | DIASTOLIC BLOOD PRESSURE: 78 MMHG | WEIGHT: 140.31 LBS | SYSTOLIC BLOOD PRESSURE: 112 MMHG

## 2017-11-08 DIAGNOSIS — Z86.32 HISTORY OF GESTATIONAL DIABETES IN PRIOR PREGNANCY, CURRENTLY PREGNANT: Primary | ICD-10-CM

## 2017-11-08 DIAGNOSIS — O26.13 LOW WEIGHT GAIN DURING PREGNANCY IN THIRD TRIMESTER: ICD-10-CM

## 2017-11-08 DIAGNOSIS — Z34.93 PRENATAL CARE IN THIRD TRIMESTER: ICD-10-CM

## 2017-11-08 DIAGNOSIS — O09.299 HISTORY OF GESTATIONAL DIABETES IN PRIOR PREGNANCY, CURRENTLY PREGNANT: Primary | ICD-10-CM

## 2017-11-08 PROCEDURE — 0502F SUBSEQUENT PRENATAL CARE: CPT | Mod: ,,, | Performed by: ADVANCED PRACTICE MIDWIFE

## 2017-11-08 PROCEDURE — 99211 OFF/OP EST MAY X REQ PHY/QHP: CPT | Mod: PBBFAC | Performed by: ADVANCED PRACTICE MIDWIFE

## 2017-11-08 PROCEDURE — 99999 PR PBB SHADOW E&M-EST. PATIENT-LVL I: CPT | Mod: PBBFAC,,, | Performed by: ADVANCED PRACTICE MIDWIFE

## 2017-11-08 NOTE — PROGRESS NOTES
Lack of weight gain discussed.  States she brought a diet history to the last visit but it was not reviewed. States when she did it, she realized she needed to increase her dietary intake.   Smoothies from [facility]-one a day as a snack. Eating three meals and two snacks.   States she can't remember what she ate yesterday. Oral report of an average day:    -breakfast of two eggs and two pieces of sausage,   -granula bar snack   - pnut or cheese sandwich for lunch with pretzels    -Smoothie in afternoon or peanut crackers.   -Dinner of meat and veggie and carb of some type.  Suggestions to encourage weight gain: increase number of carbs to one with each meal and snack. To increase fruits and veggie intake. Nutritious snacks reviewed.  HIV/RPR ordered  Vertex per Leopold's today.  FMR, PTL and 3T warning signs reviewed..

## 2017-11-13 ENCOUNTER — NURSE TRIAGE (OUTPATIENT)
Dept: ADMINISTRATIVE | Facility: CLINIC | Age: 27
End: 2017-11-13

## 2017-11-13 ENCOUNTER — TELEPHONE (OUTPATIENT)
Dept: OBSTETRICS AND GYNECOLOGY | Facility: CLINIC | Age: 27
End: 2017-11-13

## 2017-11-13 NOTE — TELEPHONE ENCOUNTER
"  Reason for Disposition   Slight spotting after sexual intercourse (brief episode) (all triage questions negative)    Answer Assessment - Initial Assessment Questions  1. ONSET: "When did this bleeding start?"         Started 30 min ago  2. DESCRIPTION: "Describe the bleeding that you are having." "How much bleeding is there?"     - SPOTTING: spotting, or pinkish / brownish mucous discharge; does not fill panti-liner or pad     - MILD:  less than 1 pad / hour; less than patient's usual menstrual bleeding    - MODERATE: 1-2 pads / hour; small-medium blood clots (e.g., pea, grape, small coin)     - SEVERE: soaking 2 or more pads/hour for 2 or more hours; bleeding not contained by pads or continuous red blood from vagina; large blood clots (e.g., golf ball, large coin)       Spotting not filled a panti liner yet  3. ABDOMINAL PAIN SEVERITY: If present, ask: "How bad is it?"  (e.g., Scale 1-10; mild, moderate, or severe)    - MILD (1-3): doesn't interfere with normal activities, abdomen soft and not tender to touch     - MODERATE (4-7): interferes with normal activities or awakens from sleep, tender to touch     - SEVERE (8-10): excruciating pain, doubled over, unable to do any normal activities      No more than normal after sex  4. PREGNANCY: "Do you know how many weeks or months pregnant you are?"       33  5. ASIF: "What date are you expecting to deliver?"      12/31  6. FETAL MOVEMENT: "Has the baby's movement decreased or changed significantly from normal?"      No not really has moved some  7. HEMODYNAMIC STATUS: "Are you weak or feeling lightheaded?" If so, ask: "Can you stand and walk normally?"       no  8. OTHER SYMPTOMS: "What other symptoms are you having with the bleeding?" (e.g., leaking fluid from vagina, contractions)      No  [place] this morning and bleeding started after. Has not had previouis bleeding after intercourse    Protocols used: ST PREGNANCY - VAGINAL BLEEDING GREATER THAN 20 WEEKS " BRIAN

## 2017-11-13 NOTE — TELEPHONE ENCOUNTER
Called Cydney back regarding spotting after intercourse this morning. Message left on the voice mail regarding normalcy of spotting after intercourse. S&S of PTL reviewed. Patient encouraged to call if she has further vaginal bleeding, S&S of PTL or any questions.

## 2017-11-18 ENCOUNTER — TELEPHONE (OUTPATIENT)
Dept: OBSTETRICS AND GYNECOLOGY | Facility: CLINIC | Age: 27
End: 2017-11-18

## 2017-11-18 DIAGNOSIS — O26.13 LOW WEIGHT GAIN DURING PREGNANCY IN THIRD TRIMESTER: Primary | ICD-10-CM

## 2017-11-18 NOTE — TELEPHONE ENCOUNTER
Cydney's lack of weight gain reviewed at MD/CNM meeting of 11/16. Dr. Lovelace recommended a follow up [facility] ultrasound to evaluate fetal growth. Ultrasound ordered and Cydney notified by message on voice mail.

## 2017-11-22 ENCOUNTER — ROUTINE PRENATAL (OUTPATIENT)
Dept: OBSTETRICS AND GYNECOLOGY | Facility: CLINIC | Age: 27
End: 2017-11-22
Payer: OTHER GOVERNMENT

## 2017-11-22 ENCOUNTER — LAB VISIT (OUTPATIENT)
Dept: LAB | Facility: OTHER | Age: 27
End: 2017-11-22
Attending: ADVANCED PRACTICE MIDWIFE
Payer: OTHER GOVERNMENT

## 2017-11-22 VITALS
SYSTOLIC BLOOD PRESSURE: 112 MMHG | DIASTOLIC BLOOD PRESSURE: 78 MMHG | WEIGHT: 143.94 LBS | BODY MASS INDEX: 28.12 KG/M2

## 2017-11-22 DIAGNOSIS — Z3A.34 34 WEEKS GESTATION OF PREGNANCY: ICD-10-CM

## 2017-11-22 DIAGNOSIS — Z34.83 ENCOUNTER FOR SUPERVISION OF OTHER NORMAL PREGNANCY IN THIRD TRIMESTER: ICD-10-CM

## 2017-11-22 DIAGNOSIS — Z34.83 ENCOUNTER FOR SUPERVISION OF OTHER NORMAL PREGNANCY IN THIRD TRIMESTER: Primary | ICD-10-CM

## 2017-11-22 PROCEDURE — 99999 PR PBB SHADOW E&M-EST. PATIENT-LVL II: CPT | Mod: PBBFAC,,, | Performed by: ADVANCED PRACTICE MIDWIFE

## 2017-11-22 PROCEDURE — 86703 HIV-1/HIV-2 1 RESULT ANTBDY: CPT

## 2017-11-22 PROCEDURE — 86592 SYPHILIS TEST NON-TREP QUAL: CPT

## 2017-11-22 PROCEDURE — 99213 OFFICE O/P EST LOW 20 MIN: CPT | Mod: S$PBB,,, | Performed by: ADVANCED PRACTICE MIDWIFE

## 2017-11-22 PROCEDURE — 99212 OFFICE O/P EST SF 10 MIN: CPT | Mod: PBBFAC | Performed by: ADVANCED PRACTICE MIDWIFE

## 2017-11-22 PROCEDURE — 36415 COLL VENOUS BLD VENIPUNCTURE: CPT

## 2017-11-22 NOTE — PROGRESS NOTES
Doing well today  Denies LOF, VB or Ctx  Daily   Discussed low wt gain  Breast pump ordered   GBS next time   U/S for fetal growth already ordered and scheduled for 12/06/17

## 2017-11-24 LAB
HIV 1+2 AB+HIV1 P24 AG SERPL QL IA: NEGATIVE
RPR SER QL: NORMAL

## 2017-11-28 ENCOUNTER — TELEPHONE (OUTPATIENT)
Dept: OBSTETRICS AND GYNECOLOGY | Facility: CLINIC | Age: 27
End: 2017-11-28

## 2017-11-28 ENCOUNTER — TELEPHONE (OUTPATIENT)
Dept: OBSTETRICS AND GYNECOLOGY | Facility: HOSPITAL | Age: 27
End: 2017-11-28

## 2017-11-28 NOTE — TELEPHONE ENCOUNTER
Returned pt call.  Left vm message asking pt to return call to clinic         ----- Message from Jade Nielsen sent at 11/27/2017 11:48 AM CST -----  Contact: pt  x_ 1st Request  _ 2nd Request  _ 3rd Request    Who: pt    Why: is experiencing vaginal spotting.    What Number to Call Back: [phone]    When to Expect a call back: (Before the end of the day)  -- if call after 3:00 call back will be tomorrow.

## 2017-11-29 NOTE — TELEPHONE ENCOUNTER
Voice mail message left for Cydney regarding possible causes of vaginal spotting at 35w gestation, including post intercourse or post exam irritation of the cervix and cervical change related to PTL. Patient instructed to call the after hours number if she is having regular uterine contractions and to report to JEANA if she experiences active vaginal bleeding.

## 2017-12-05 ENCOUNTER — ROUTINE PRENATAL (OUTPATIENT)
Dept: OBSTETRICS AND GYNECOLOGY | Facility: CLINIC | Age: 27
End: 2017-12-05
Payer: OTHER GOVERNMENT

## 2017-12-05 VITALS
BODY MASS INDEX: 28.46 KG/M2 | SYSTOLIC BLOOD PRESSURE: 112 MMHG | DIASTOLIC BLOOD PRESSURE: 68 MMHG | WEIGHT: 145.75 LBS

## 2017-12-05 DIAGNOSIS — Z34.93 PRENATAL CARE IN THIRD TRIMESTER: Primary | ICD-10-CM

## 2017-12-05 PROCEDURE — 99999 PR PBB SHADOW E&M-EST. PATIENT-LVL III: CPT | Mod: PBBFAC,,, | Performed by: ADVANCED PRACTICE MIDWIFE

## 2017-12-05 PROCEDURE — 87081 CULTURE SCREEN ONLY: CPT

## 2017-12-05 PROCEDURE — 0502F SUBSEQUENT PRENATAL CARE: CPT | Mod: ,,, | Performed by: ADVANCED PRACTICE MIDWIFE

## 2017-12-05 PROCEDURE — 99213 OFFICE O/P EST LOW 20 MIN: CPT | Mod: PBBFAC | Performed by: ADVANCED PRACTICE MIDWIFE

## 2017-12-05 NOTE — PROGRESS NOTES
Here for routine OB appt, with no complaints.  Reports good FM; daily FMC reinforced. Denies LOF, denies VB, rare contractions.  GBS collected, reviewed HIV/RPR neg  FH: S<D and questionable fetal lie - she has growth sono scheduled tomorrow with MFM  Reviewed warning signs - RTC weekly

## 2017-12-06 ENCOUNTER — OFFICE VISIT (OUTPATIENT)
Dept: MATERNAL FETAL MEDICINE | Facility: CLINIC | Age: 27
End: 2017-12-06
Payer: OTHER GOVERNMENT

## 2017-12-06 DIAGNOSIS — O26.843 UTERINE SIZE-DATE DISCREPANCY IN THIRD TRIMESTER: ICD-10-CM

## 2017-12-06 DIAGNOSIS — O26.13 LOW WEIGHT GAIN DURING PREGNANCY IN THIRD TRIMESTER: ICD-10-CM

## 2017-12-06 PROCEDURE — 76816 OB US FOLLOW-UP PER FETUS: CPT | Mod: PBBFAC | Performed by: OBSTETRICS & GYNECOLOGY

## 2017-12-06 PROCEDURE — 76816 OB US FOLLOW-UP PER FETUS: CPT | Mod: 26,S$PBB,, | Performed by: OBSTETRICS & GYNECOLOGY

## 2017-12-06 PROCEDURE — 99499 UNLISTED E&M SERVICE: CPT | Mod: S$PBB,,, | Performed by: OBSTETRICS & GYNECOLOGY

## 2017-12-06 NOTE — PROGRESS NOTES
OB Ultrasound Report (see PDF version under imaging tab)    Indication  ========    Evaluation of fetal growth: size less than dates.    History  ======    Previous Outcomes   2  Para 1    Pregnancy History  ===============    Maternal Lab Tests  Genetic screening declined.      Method  ======    Transabdominal ultrasound examination. View: Sufficient.    Pregnancy  =========    Leigh pregnancy. Number of fetuses: 1.    Dating  ======    Cycle: regular cycle  Ultrasound examination on: 2017  GA by U/S based upon: AC, BPD, Femur, HC  GA by U/S 34 w + 4 d  ASIF by U/S: 2018  Assigned: Dating performed on 2017, based on the LMP  Assigned GA 36 w + 3 d  Assigned ASIF: 2017    General Evaluation  ===============    Cardiac activity: present.  bpm.  Fetal movements: visualized.  Presentation: cephalic.  Placenta: posterior.  Umbilical cord: 3 vessel cord.  Amniotic fluid: MVP 7.8 cm. UMU 20.0 cm. Q1 7.0 cm, Q2 7.2 cm, Q3 3.7 cm, Q4 2.2 cm.    Fetal Biometry  ===========    Fetal Biometry  BPD 83.7 mm 33w 5d Hadlock  .7 mm 35w 4d Kristie  .8 mm 34w 1d Hadlock  .6 mm 35w 4d Hadlock  Femur 67.9 mm 34w 6d Hadlock  EFW 2,580 g 17% Jame  Calculated by: Hadlock (BPD-HC-AC-FL)  EFW (lb) 5 lb  EFW (oz) 11 oz  Cephalic index 0.78  HC / AC 0.97  FL / BPD 0.81  FL / AC 0.21  MVP 7.8 cm  UMU 20.0 cm   bpm  Head / Face / Neck   4.2 mm    Fetal Anatomy  ===========    Cranium: normal  Lateral ventricles: normal  Stomach: normal  Kidneys: normal  Bladder: normal  Genitals: documented previously  Gender: female  Wants to know gender: yes  Other: A full anatomy survey previously performed.    Impression  =========    Fetal size is AGA with the EFW at the 17th percentile.  Normal repeat limited fetal anatomic survey. AFV is normal. Follow-up ultrasound as clinically indicated.

## 2017-12-07 LAB — BACTERIA SPEC AEROBE CULT: NORMAL

## 2017-12-13 ENCOUNTER — ROUTINE PRENATAL (OUTPATIENT)
Dept: OBSTETRICS AND GYNECOLOGY | Facility: CLINIC | Age: 27
End: 2017-12-13
Payer: OTHER GOVERNMENT

## 2017-12-13 VITALS
WEIGHT: 147.81 LBS | SYSTOLIC BLOOD PRESSURE: 106 MMHG | DIASTOLIC BLOOD PRESSURE: 68 MMHG | BODY MASS INDEX: 28.87 KG/M2

## 2017-12-13 DIAGNOSIS — Z3A.37 37 WEEKS GESTATION OF PREGNANCY: ICD-10-CM

## 2017-12-13 DIAGNOSIS — Z34.83 ENCOUNTER FOR SUPERVISION OF OTHER NORMAL PREGNANCY IN THIRD TRIMESTER: Primary | ICD-10-CM

## 2017-12-13 PROCEDURE — 99999 PR PBB SHADOW E&M-EST. PATIENT-LVL II: CPT | Mod: PBBFAC,,, | Performed by: ADVANCED PRACTICE MIDWIFE

## 2017-12-13 PROCEDURE — 99212 OFFICE O/P EST SF 10 MIN: CPT | Mod: PBBFAC | Performed by: ADVANCED PRACTICE MIDWIFE

## 2017-12-13 PROCEDURE — 99213 OFFICE O/P EST LOW 20 MIN: CPT | Mod: S$PBB,,, | Performed by: ADVANCED PRACTICE MIDWIFE

## 2017-12-13 NOTE — PROGRESS NOTES
Doing well today  Denies Ctx, VB or LOF   Daily BH  Had U/S last week  Vertex confirmed  Reviewed NEG GBS and HIV & RPR  Reviewed S/S labor and when to call

## 2017-12-19 ENCOUNTER — ROUTINE PRENATAL (OUTPATIENT)
Dept: OBSTETRICS AND GYNECOLOGY | Facility: CLINIC | Age: 27
End: 2017-12-19
Payer: OTHER GOVERNMENT

## 2017-12-19 VITALS — BODY MASS INDEX: 28.61 KG/M2 | DIASTOLIC BLOOD PRESSURE: 70 MMHG | WEIGHT: 146.5 LBS | SYSTOLIC BLOOD PRESSURE: 106 MMHG

## 2017-12-19 DIAGNOSIS — Z34.83 ENCOUNTER FOR SUPERVISION OF OTHER NORMAL PREGNANCY IN THIRD TRIMESTER: Primary | ICD-10-CM

## 2017-12-19 DIAGNOSIS — Z3A.38 38 WEEKS GESTATION OF PREGNANCY: ICD-10-CM

## 2017-12-19 PROCEDURE — 99999 PR PBB SHADOW E&M-EST. PATIENT-LVL I: CPT | Mod: PBBFAC,,, | Performed by: ADVANCED PRACTICE MIDWIFE

## 2017-12-19 PROCEDURE — 99211 OFF/OP EST MAY X REQ PHY/QHP: CPT | Mod: PBBFAC | Performed by: ADVANCED PRACTICE MIDWIFE

## 2017-12-19 PROCEDURE — 99213 OFFICE O/P EST LOW 20 MIN: CPT | Mod: S$PBB,,, | Performed by: ADVANCED PRACTICE MIDWIFE

## 2017-12-29 ENCOUNTER — ROUTINE PRENATAL (OUTPATIENT)
Dept: OBSTETRICS AND GYNECOLOGY | Facility: CLINIC | Age: 27
End: 2017-12-29
Payer: OTHER GOVERNMENT

## 2017-12-29 VITALS
WEIGHT: 151.88 LBS | SYSTOLIC BLOOD PRESSURE: 112 MMHG | BODY MASS INDEX: 29.67 KG/M2 | DIASTOLIC BLOOD PRESSURE: 72 MMHG

## 2017-12-29 DIAGNOSIS — Z3A.39 39 WEEKS GESTATION OF PREGNANCY: ICD-10-CM

## 2017-12-29 DIAGNOSIS — Z34.83 ENCOUNTER FOR SUPERVISION OF OTHER NORMAL PREGNANCY IN THIRD TRIMESTER: Primary | ICD-10-CM

## 2017-12-29 PROCEDURE — 99999 PR PBB SHADOW E&M-EST. PATIENT-LVL II: CPT | Mod: PBBFAC,,, | Performed by: ADVANCED PRACTICE MIDWIFE

## 2017-12-29 PROCEDURE — 99213 OFFICE O/P EST LOW 20 MIN: CPT | Mod: S$PBB,,, | Performed by: ADVANCED PRACTICE MIDWIFE

## 2017-12-29 PROCEDURE — 99212 OFFICE O/P EST SF 10 MIN: CPT | Mod: PBBFAC | Performed by: ADVANCED PRACTICE MIDWIFE

## 2017-12-29 NOTE — PROGRESS NOTES
doinr well today  Denies VB, LOF but occas ctx with back pain  Daily   Reviewed S/S of labor and when to call

## 2017-12-31 ENCOUNTER — ANESTHESIA (OUTPATIENT)
Dept: OBSTETRICS AND GYNECOLOGY | Facility: OTHER | Age: 27
End: 2017-12-31
Payer: OTHER GOVERNMENT

## 2017-12-31 ENCOUNTER — HOSPITAL ENCOUNTER (INPATIENT)
Facility: OTHER | Age: 27
LOS: 1 days | Discharge: HOME | End: 2018-01-01
Attending: OBSTETRICS & GYNECOLOGY | Admitting: OBSTETRICS & GYNECOLOGY
Payer: OTHER GOVERNMENT

## 2017-12-31 DIAGNOSIS — O34.219 VBAC, DELIVERED: ICD-10-CM

## 2017-12-31 DIAGNOSIS — Z37.9 NORMAL LABOR: ICD-10-CM

## 2017-12-31 LAB
ABO + RH BLD: NORMAL
ALLENS TEST: ABNORMAL
ALLENS TEST: ABNORMAL
BASOPHILS # BLD AUTO: 0.01 K/UL
BASOPHILS # BLD AUTO: 0.02 K/UL
BASOPHILS NFR BLD: 0.1 %
BASOPHILS NFR BLD: 0.2 %
BLD GP AB SCN CELLS X3 SERPL QL: NORMAL
DIFFERENTIAL METHOD: ABNORMAL
DIFFERENTIAL METHOD: ABNORMAL
EOSINOPHIL # BLD AUTO: 0 K/UL
EOSINOPHIL # BLD AUTO: 0.1 K/UL
EOSINOPHIL NFR BLD: 0.3 %
EOSINOPHIL NFR BLD: 1 %
ERYTHROCYTE [DISTWIDTH] IN BLOOD BY AUTOMATED COUNT: 13.3 %
ERYTHROCYTE [DISTWIDTH] IN BLOOD BY AUTOMATED COUNT: 13.4 %
HCO3 UR-SCNC: 18 MMOL/L (ref 24–28)
HCO3 UR-SCNC: 18.5 MMOL/L (ref 24–28)
HCT VFR BLD AUTO: 33.8 %
HCT VFR BLD AUTO: 39 %
HGB BLD-MCNC: 11.5 G/DL
HGB BLD-MCNC: 13.3 G/DL
LYMPHOCYTES # BLD AUTO: 1.5 K/UL
LYMPHOCYTES # BLD AUTO: 2.9 K/UL
LYMPHOCYTES NFR BLD: 12 %
LYMPHOCYTES NFR BLD: 22.2 %
MCH RBC QN AUTO: 30.8 PG
MCH RBC QN AUTO: 31.1 PG
MCHC RBC AUTO-ENTMCNC: 34 G/DL
MCHC RBC AUTO-ENTMCNC: 34.1 G/DL
MCV RBC AUTO: 90 FL
MCV RBC AUTO: 91 FL
MONOCYTES # BLD AUTO: 0.6 K/UL
MONOCYTES # BLD AUTO: 0.8 K/UL
MONOCYTES NFR BLD: 4.8 %
MONOCYTES NFR BLD: 6.2 %
NEUTROPHILS # BLD AUTO: 10.4 K/UL
NEUTROPHILS # BLD AUTO: 9.2 K/UL
NEUTROPHILS NFR BLD: 70.1 %
NEUTROPHILS NFR BLD: 82.6 %
PCO2 BLDA: 72.5 MMHG (ref 35–45)
PCO2 BLDA: 74 MMHG (ref 35–45)
PH SMN: 6.99 [PH] (ref 7.35–7.45)
PH SMN: 7.01 [PH] (ref 7.35–7.45)
PLATELET # BLD AUTO: 134 K/UL
PLATELET # BLD AUTO: 166 K/UL
PMV BLD AUTO: 11.1 FL
PMV BLD AUTO: 11.9 FL
PO2 BLDA: 22 MMHG (ref 80–100)
PO2 BLDA: 27 MMHG (ref 80–100)
POC BE: -13 MMOL/L
POC BE: -13 MMOL/L
POC SATURATED O2: 18 % (ref 95–100)
POC SATURATED O2: 26 % (ref 95–100)
RBC # BLD AUTO: 3.7 M/UL
RBC # BLD AUTO: 4.32 M/UL
SAMPLE: ABNORMAL
SAMPLE: ABNORMAL
SITE: ABNORMAL
SITE: ABNORMAL
WBC # BLD AUTO: 12.61 K/UL
WBC # BLD AUTO: 13.13 K/UL

## 2017-12-31 PROCEDURE — 36415 COLL VENOUS BLD VENIPUNCTURE: CPT

## 2017-12-31 PROCEDURE — 59025 FETAL NON-STRESS TEST: CPT | Mod: 26,,, | Performed by: OBSTETRICS & GYNECOLOGY

## 2017-12-31 PROCEDURE — 62326 NJX INTERLAMINAR LMBR/SAC: CPT | Performed by: STUDENT IN AN ORGANIZED HEALTH CARE EDUCATION/TRAINING PROGRAM

## 2017-12-31 PROCEDURE — 11000001 HC ACUTE MED/SURG PRIVATE ROOM

## 2017-12-31 PROCEDURE — 72200004 HC VAGINAL DELIVERY LEVEL I

## 2017-12-31 PROCEDURE — 25000003 PHARM REV CODE 250: Performed by: STUDENT IN AN ORGANIZED HEALTH CARE EDUCATION/TRAINING PROGRAM

## 2017-12-31 PROCEDURE — 59025 FETAL NON-STRESS TEST: CPT

## 2017-12-31 PROCEDURE — 86850 RBC ANTIBODY SCREEN: CPT

## 2017-12-31 PROCEDURE — 99900035 HC TECH TIME PER 15 MIN (STAT)

## 2017-12-31 PROCEDURE — 85025 COMPLETE CBC W/AUTO DIFF WBC: CPT | Mod: 91

## 2017-12-31 PROCEDURE — 59400 OBSTETRICAL CARE: CPT | Mod: QY,,, | Performed by: ANESTHESIOLOGY

## 2017-12-31 PROCEDURE — 82803 BLOOD GASES ANY COMBINATION: CPT

## 2017-12-31 PROCEDURE — 99285 EMERGENCY DEPT VISIT HI MDM: CPT | Mod: 25

## 2017-12-31 PROCEDURE — 0KQM0ZZ REPAIR PERINEUM MUSCLE, OPEN APPROACH: ICD-10-PCS | Performed by: OBSTETRICS & GYNECOLOGY

## 2017-12-31 PROCEDURE — 27800517 HC TRAY,EPIDURAL-CONTINUOUS: Performed by: STUDENT IN AN ORGANIZED HEALTH CARE EDUCATION/TRAINING PROGRAM

## 2017-12-31 PROCEDURE — 99282 EMERGENCY DEPT VISIT SF MDM: CPT | Mod: 25,,, | Performed by: OBSTETRICS & GYNECOLOGY

## 2017-12-31 PROCEDURE — 27200710 HC EPIDURAL INFUSION PUMP SET: Performed by: STUDENT IN AN ORGANIZED HEALTH CARE EDUCATION/TRAINING PROGRAM

## 2017-12-31 PROCEDURE — 63600175 PHARM REV CODE 636 W HCPCS: Performed by: STUDENT IN AN ORGANIZED HEALTH CARE EDUCATION/TRAINING PROGRAM

## 2017-12-31 RX ORDER — OXYTOCIN/RINGER'S LACTATE 20/1000 ML
20 PLASTIC BAG, INJECTION (ML) INTRAVENOUS ONCE
Status: DISCONTINUED | OUTPATIENT
Start: 2017-12-31 | End: 2018-01-01 | Stop reason: HOSPADM

## 2017-12-31 RX ORDER — OXYTOCIN/RINGER'S LACTATE 20/1000 ML
41.65 PLASTIC BAG, INJECTION (ML) INTRAVENOUS CONTINUOUS
Status: ACTIVE | OUTPATIENT
Start: 2017-12-31 | End: 2018-01-01

## 2017-12-31 RX ORDER — CARBOPROST TROMETHAMINE 250 UG/ML
250 INJECTION, SOLUTION INTRAMUSCULAR
Status: DISCONTINUED | OUTPATIENT
Start: 2017-12-31 | End: 2018-01-01 | Stop reason: HOSPADM

## 2017-12-31 RX ORDER — METHYLERGONOVINE MALEATE 0.2 MG/ML
INJECTION INTRAVENOUS
Status: DISPENSED
Start: 2017-12-31 | End: 2017-12-31

## 2017-12-31 RX ORDER — ONDANSETRON 8 MG/1
8 TABLET, ORALLY DISINTEGRATING ORAL EVERY 8 HOURS PRN
Status: DISCONTINUED | OUTPATIENT
Start: 2017-12-31 | End: 2017-12-31

## 2017-12-31 RX ORDER — FENTANYL/BUPIVACAINE/NS/PF 2MCG/ML-.1
PLASTIC BAG, INJECTION (ML) INJECTION CONTINUOUS
Status: DISCONTINUED | OUTPATIENT
Start: 2017-12-31 | End: 2018-01-01 | Stop reason: HOSPADM

## 2017-12-31 RX ORDER — FENTANYL/BUPIVACAINE/NS/PF 2MCG/ML-.1
PLASTIC BAG, INJECTION (ML) INJECTION
Status: DISPENSED
Start: 2017-12-31 | End: 2017-12-31

## 2017-12-31 RX ORDER — HYDROCORTISONE 25 MG/G
CREAM TOPICAL 3 TIMES DAILY PRN
Status: DISCONTINUED | OUTPATIENT
Start: 2017-12-31 | End: 2018-01-01 | Stop reason: HOSPADM

## 2017-12-31 RX ORDER — OXYCODONE AND ACETAMINOPHEN 10; 325 MG/1; MG/1
1 TABLET ORAL EVERY 4 HOURS PRN
Status: DISCONTINUED | OUTPATIENT
Start: 2017-12-31 | End: 2018-01-01 | Stop reason: HOSPADM

## 2017-12-31 RX ORDER — DIPHENHYDRAMINE HCL 25 MG
25 CAPSULE ORAL EVERY 4 HOURS PRN
Status: DISCONTINUED | OUTPATIENT
Start: 2017-12-31 | End: 2018-01-01 | Stop reason: HOSPADM

## 2017-12-31 RX ORDER — DIPHENHYDRAMINE HYDROCHLORIDE 50 MG/ML
25 INJECTION INTRAMUSCULAR; INTRAVENOUS EVERY 4 HOURS PRN
Status: DISCONTINUED | OUTPATIENT
Start: 2017-12-31 | End: 2018-01-01 | Stop reason: HOSPADM

## 2017-12-31 RX ORDER — METHYLERGONOVINE MALEATE 0.2 MG/ML
200 INJECTION INTRAVENOUS
Status: DISCONTINUED | OUTPATIENT
Start: 2017-12-31 | End: 2018-01-01 | Stop reason: HOSPADM

## 2017-12-31 RX ORDER — FENTANYL CITRATE 50 UG/ML
INJECTION, SOLUTION INTRAMUSCULAR; INTRAVENOUS
Status: COMPLETED
Start: 2017-12-31 | End: 2017-12-31

## 2017-12-31 RX ORDER — ACETAMINOPHEN 325 MG/1
650 TABLET ORAL EVERY 6 HOURS PRN
Status: DISCONTINUED | OUTPATIENT
Start: 2017-12-31 | End: 2018-01-01 | Stop reason: HOSPADM

## 2017-12-31 RX ORDER — LIDOCAINE HYDROCHLORIDE AND EPINEPHRINE 15; 5 MG/ML; UG/ML
INJECTION, SOLUTION EPIDURAL
Status: DISCONTINUED | OUTPATIENT
Start: 2017-12-31 | End: 2017-12-31

## 2017-12-31 RX ORDER — BUPIVACAINE HYDROCHLORIDE 2.5 MG/ML
INJECTION, SOLUTION EPIDURAL; INFILTRATION; INTRACAUDAL
Status: DISPENSED
Start: 2017-12-31 | End: 2017-12-31

## 2017-12-31 RX ORDER — MISOPROSTOL 200 UG/1
TABLET ORAL
Status: DISPENSED
Start: 2017-12-31 | End: 2017-12-31

## 2017-12-31 RX ORDER — CARBOPROST TROMETHAMINE 250 UG/ML
INJECTION, SOLUTION INTRAMUSCULAR
Status: DISPENSED
Start: 2017-12-31 | End: 2017-12-31

## 2017-12-31 RX ORDER — OXYCODONE AND ACETAMINOPHEN 5; 325 MG/1; MG/1
1 TABLET ORAL EVERY 4 HOURS PRN
Status: DISCONTINUED | OUTPATIENT
Start: 2017-12-31 | End: 2018-01-01 | Stop reason: HOSPADM

## 2017-12-31 RX ORDER — DOCUSATE SODIUM 100 MG/1
200 CAPSULE, LIQUID FILLED ORAL 2 TIMES DAILY PRN
Status: DISCONTINUED | OUTPATIENT
Start: 2017-12-31 | End: 2018-01-01 | Stop reason: HOSPADM

## 2017-12-31 RX ORDER — DIPHENHYDRAMINE HYDROCHLORIDE 50 MG/ML
12.5 INJECTION INTRAMUSCULAR; INTRAVENOUS EVERY 4 HOURS PRN
Status: DISCONTINUED | OUTPATIENT
Start: 2017-12-31 | End: 2018-01-01 | Stop reason: HOSPADM

## 2017-12-31 RX ORDER — MISOPROSTOL 200 UG/1
600 TABLET ORAL
Status: DISCONTINUED | OUTPATIENT
Start: 2017-12-31 | End: 2018-01-01 | Stop reason: HOSPADM

## 2017-12-31 RX ORDER — FENTANYL/BUPIVACAINE/NS/PF 2MCG/ML-.1
PLASTIC BAG, INJECTION (ML) INJECTION CONTINUOUS PRN
Status: DISCONTINUED | OUTPATIENT
Start: 2017-12-31 | End: 2017-12-31

## 2017-12-31 RX ORDER — SODIUM CHLORIDE 9 MG/ML
INJECTION, SOLUTION INTRAVENOUS
Status: DISCONTINUED | OUTPATIENT
Start: 2017-12-31 | End: 2017-12-31

## 2017-12-31 RX ORDER — OXYTOCIN/RINGER'S LACTATE 20/1000 ML
PLASTIC BAG, INJECTION (ML) INTRAVENOUS
Status: DISPENSED
Start: 2017-12-31 | End: 2017-12-31

## 2017-12-31 RX ORDER — IBUPROFEN 600 MG/1
600 TABLET ORAL EVERY 6 HOURS
Status: DISCONTINUED | OUTPATIENT
Start: 2017-12-31 | End: 2018-01-01 | Stop reason: HOSPADM

## 2017-12-31 RX ORDER — ONDANSETRON 8 MG/1
8 TABLET, ORALLY DISINTEGRATING ORAL EVERY 8 HOURS PRN
Status: DISCONTINUED | OUTPATIENT
Start: 2017-12-31 | End: 2018-01-01 | Stop reason: HOSPADM

## 2017-12-31 RX ORDER — FENTANYL CITRATE 50 UG/ML
INJECTION, SOLUTION INTRAMUSCULAR; INTRAVENOUS
Status: DISCONTINUED | OUTPATIENT
Start: 2017-12-31 | End: 2017-12-31

## 2017-12-31 RX ORDER — OXYTOCIN/RINGER'S LACTATE 20/1000 ML
41.65 PLASTIC BAG, INJECTION (ML) INTRAVENOUS CONTINUOUS
Status: ACTIVE | OUTPATIENT
Start: 2017-12-31 | End: 2017-12-31

## 2017-12-31 RX ADMIN — ONDANSETRON 8 MG: 8 TABLET, ORALLY DISINTEGRATING ORAL at 06:12

## 2017-12-31 RX ADMIN — Medication 10 ML/HR: at 04:12

## 2017-12-31 RX ADMIN — Medication 5 ML: at 04:12

## 2017-12-31 RX ADMIN — DOCUSATE SODIUM 200 MG: 100 CAPSULE, LIQUID FILLED ORAL at 11:12

## 2017-12-31 RX ADMIN — FENTANYL CITRATE 100 MCG: 50 INJECTION, SOLUTION INTRAMUSCULAR; INTRAVENOUS at 04:12

## 2017-12-31 RX ADMIN — SODIUM CHLORIDE, SODIUM LACTATE, POTASSIUM CHLORIDE, AND CALCIUM CHLORIDE 1000 ML: .6; .31; .03; .02 INJECTION, SOLUTION INTRAVENOUS at 02:12

## 2017-12-31 RX ADMIN — HYDROCORTISONE 2.5%: 25 CREAM TOPICAL at 05:12

## 2017-12-31 RX ADMIN — LIDOCAINE HYDROCHLORIDE,EPINEPHRINE BITARTRATE 3 ML: 15; .005 INJECTION, SOLUTION EPIDURAL; INFILTRATION; INTRACAUDAL; PERINEURAL at 03:12

## 2017-12-31 RX ADMIN — IBUPROFEN 600 MG: 600 TABLET, FILM COATED ORAL at 04:12

## 2017-12-31 RX ADMIN — IBUPROFEN 600 MG: 600 TABLET, FILM COATED ORAL at 11:12

## 2017-12-31 NOTE — ANESTHESIA PROCEDURE NOTES
Epidural    Patient location during procedure: OB   Reason for block: primary anesthetic   Diagnosis: IUP   Start time: 12/31/2017 3:30 AM  Timeout: 12/31/2017 3:28 AM  End time: 12/31/2017 3:57 AM  Staffing  Anesthesiologist: KEVIN LUCAS  Resident/CRNA: KEELY ARSHAD  Performed: resident/CRNA   Preanesthetic Checklist  Completed: patient identified, site marked, surgical consent, pre-op evaluation, timeout performed, IV checked, risks and benefits discussed, monitors and equipment checked, anesthesia consent given, hand hygiene performed and patient being monitored  Preparation  Patient position: sitting  Prep: ChloraPrep  Patient monitoring: Pulse Ox and Blood Pressure  Epidural  Skin Anesthetic: lidocaine 1%  Skin Wheal: 3 mL  Administration type: continuous  Approach: midline  Interspace: L3-4  Injection technique: TALIB saline  Needle and Epidural Catheter  Needle type: Tuohy   Needle gauge: 17  Needle length: 3.5 inches  Needle insertion depth: 6 cm  Catheter type: springwound and multi-orifice  Catheter size: 19 G  Catheter at skin depth: 10 cm  Test dose: 3 mL of lidocaine 1.5% with Epi 1-to-200,000  Additional Documentation: incremental injection, negative aspiration for heme and CSF, no paresthesia on injection, no signs/symptoms of IV or SA injection, no significant pain on injection and no significant complaints from patient  Needle localization: anatomical landmarks  Medications:  Bolus administered: 10 mL of 0.125% bupivacaine  Opioid administered: 100 mcg of   fentanyl  Volume per aspiration: 5 mL  Time between aspirations: 5 minutes  Assessment  Upper dermatomal levels - Left: T4  Right: T4  Ease of block: easy  Patient's tolerance of the procedure: comfortable throughout block and no complaints  Additional Notes  First attempt at L4-L5 unsuccessful with only os.  Attempt at L3-L4 successful, lumbar puncture with spinal needle performed, initial CSF flow obtained, attempted to administer  spinal medication but unsuccessful drawback of CSF, spinal needle removed, epidural catheter threaded easily, an epidural medications administered without issue.    Level checked with ice at 12/31/2017 4:39 AM: T4 on left, T4 on right, patient comfortable, no complaints at this time, vitals stable.

## 2017-12-31 NOTE — LACTATION NOTE
Seen pt for lactation counseling.  Discussed basics of breastfeeding.  Gave breastfeeding manual and reviewed salient point for today.  Support offered and LC number on board for latch assist.

## 2017-12-31 NOTE — H&P
Subjective:      Cydney Leonard is a 27 y.o.  female with EDC 17 at 40w0d gestation who is being admitted for labor management.  Her current obstetrical history is significant for prior , .  Patient reports leaking fluid and contractions. Exam with SROM noted- clear fluid and cervix  / -1, pt admitted for TOLAC.  PMHx:   Past Medical History:   Diagnosis Date    Gestational diabetes     Diet controlled GDM with G1 pregnancy     PSHx:   Past Surgical History:   Procedure Laterality Date     SECTION        Meds:   No current facility-administered medications on file prior to encounter.      Current Outpatient Prescriptions on File Prior to Encounter   Medication Sig Dispense Refill    loratadine (CLARITIN) 10 mg tablet Take 10 mg by mouth once daily.      PRENATAL 25/IRON FUM/FOLIC/DHA (PRENATAL-1 ORAL) Take by mouth.            Objective:       /81   Pulse 98   Temp 97.7 °F (36.5 °C) (Temporal)   Resp 20   Ht 5' (1.524 m)   Wt 68.9 kg (152 lb)   LMP 2017 (Exact Date)   SpO2 (!) 84%   Breastfeeding? No   BMI 29.69 kg/m²     General:   alert, appears stated age and cooperative   Lungs:   clear to auscultation bilaterally   Heart:   regular rate and rhythm, S1, S2 normal, no murmur, click, rub or gallop   Abdomen:  soft, non-tender; bowel sounds normal; no masses,  no organomegaly   FHT: Baseline: 130 bpm   Clyde: regular, every 2-4 minutes   Presentations: cephalic   Cervix:    Extremities: WNL              Recent Results (from the past 4 hour(s))   CBC with Auto Differential    Collection Time: 17  2:55 AM   Result Value Ref Range    WBC 13.13 (H) 3.90 - 12.70 K/uL    RBC 4.32 4.00 - 5.40 M/uL    Hemoglobin 13.3 12.0 - 16.0 g/dL    Hematocrit 39.0 37.0 - 48.5 %    MCV 90 82 - 98 fL    MCH 30.8 27.0 - 31.0 pg    MCHC 34.1 32.0 - 36.0 g/dL    RDW 13.3 11.5 - 14.5 %    Platelets 166 150 - 350 K/uL    MPV 11.9 9.2 - 12.9 fL    Gran # 9.2 (H) 1.8 - 7.7  K/uL    Lymph # 2.9 1.0 - 4.8 K/uL    Mono # 0.8 0.3 - 1.0 K/uL    Eos # 0.1 0.0 - 0.5 K/uL    Baso # 0.02 0.00 - 0.20 K/uL    Gran% 70.1 38.0 - 73.0 %    Lymph% 22.2 18.0 - 48.0 %    Mono% 6.2 4.0 - 15.0 %    Eosinophil% 1.0 0.0 - 8.0 %    Basophil% 0.2 0.0 - 1.9 %    Differential Method Automated              Assessment:       40w0d weeks gestation.   Patient Active Problem List   Diagnosis    Hx CS. OP report in media section - LTCS with 2 layer closure; 17 cleared for TOLAC per Radu    H/o GDM in prior pregnancy; 28 wk 1 hr GTT abnormal w 3 hr GTT WNL    Pre preg BMI 27    Prenatal care in third trimester    Low weight gain during pregnancy in third trimester    Normal labor           Plan:      Risks, benefits, alternatives and possible complications have been discussed in detail with the patient.   1. Admit to L&D  2. Consents signed  3.Pt desires     .

## 2017-12-31 NOTE — ED PROVIDER NOTES
Encounter Date: 2017       History     Chief Complaint   Patient presents with    Contractions     Cydney Leonard is a 27 y.o. [id number] at 40w0d presents complaining of contractions.  Patient was found to have SROM'd 30 minutes ago. Clear fluid noted, cervix 7/90/-1, vertex.            Review of patient's allergies indicates:   Allergen Reactions    Sulfa (sulfonamide antibiotics) Hives     Past Medical History:   Diagnosis Date    Gestational diabetes     Diet controlled GDM with G1 pregnancy     Past Surgical History:   Procedure Laterality Date     SECTION       Family History   Problem Relation Age of Onset    Alzheimer's disease Paternal Grandmother     Stroke Maternal Grandfather     Diabetes Maternal Grandfather     Breast cancer Neg Hx     Colon cancer Neg Hx     Ovarian cancer Neg Hx      Social History   Substance Use Topics    Smoking status: Never Smoker    Smokeless tobacco: Never Used    Alcohol use No      Comment: socially before pregnancy     Review of Systems   Constitutional: Negative for chills, fatigue and fever.   HENT: Negative for congestion.    Eyes: Negative for visual disturbance.   Respiratory: Negative for cough and shortness of breath.    Cardiovascular: Negative for chest pain and palpitations.   Gastrointestinal: Negative for abdominal distention, abdominal pain, constipation, diarrhea, nausea and vomiting.   Genitourinary: Negative for difficulty urinating, dysuria, hematuria, vaginal bleeding and vaginal discharge.   Skin: Negative for rash.   Neurological: Negative for dizziness, seizures, light-headedness and headaches.   Hematological: Does not bruise/bleed easily.   Psychiatric/Behavioral: Negative for dysphoric mood. The patient is not nervous/anxious.        Physical Exam          Physical Exam    Vitals reviewed.  Constitutional: She appears well-developed and well-nourished. She is not diaphoretic. No distress.   HENT:   Head: Normocephalic  and atraumatic.   Cardiovascular: Normal rate, regular rhythm, normal heart sounds and intact distal pulses.   Pulmonary/Chest: No respiratory distress.   Abdominal: Soft. There is no rebound and no guarding.   Gravid c/w dates   Neurological: She is alert and oriented to person, place, and time. She has normal strength. No sensory deficit.   Psychiatric: She has a normal mood and affect. Her behavior is normal. Judgment and thought content normal.     OB LABOR EXAM:                       Comments: FHT: reactive/reassuring, 130bpm, mod BTB variability, + accels, -decels  TOCO: q2min       ED Course   Fetal non-stress test  Date/Time: 12/31/2017 6:18 AM  Performed by: MESFIN ADAIR  Authorized by: BOBBY WILSON     Nonstress Test:     Variability:  6-25 BPM    Decelerations:  None    Accelerations:  15 bpm    Baseline:  130    Contractions:  Regular    Contraction Frequency:  2 min  Biophysical Profile:     Nonstress Test Interpretation: reactive      Overall Impression:  Reassuring      Labs Reviewed - No data to display          Medical Decision Making:   ED Management:  Patient SROM'd, laboring  Admit to L&D  See H&P for further details  Other:   I have discussed this case with another health care provider.              Attending Attestation:   Physician Attestation Statement for Resident:  As the supervising MD   Physician Attestation Statement: I have personally seen and examined this patient.   I agree with the above history. -:   As the supervising MD I agree with the above PE.    As the supervising MD I agree with the above treatment, course, plan, and disposition.  I was personally present during the critical portions of the procedure(s) performed by the resident and was immediately available in the ED to provide services and assistance as needed during the entire procedure.  I have reviewed and agree with the residents interpretation of the following: rhythm strips.  I have reviewed the following:  old records at this facility.                    ED Course      Clinical Impression:   The encounter diagnosis was Normal labor.                           Sabrina Francis MD  Resident  12/31/17 0250       Karen Guzman MD  12/31/17 8220

## 2017-12-31 NOTE — ANESTHESIA PREPROCEDURE EVALUATION
[facility]  Anesthesia Pre-Operative Evaluation       Patient Name: Cydney Leonard  YOB: 1990  MRN: [id number]    SUBJECTIVE:     Cydney Leonard is a 27 y.o. female  40w0d presenting for contractions and SROM.    Current pregnancy complicated by gDM diet controlled and TOLAC.    Denies hx of seizures, strokes, HTN, heart failure, smoking, asthma, COPD, acid reflux, liver disease, kidney disease, bleeding disorders, taking blood thinners, back surgery.  Lab Results   Component Value Date     10/11/2017     1 prior pregnancy, was laboring without epidural, needed  with general anesthesia due to nuchal cord.    For this pregnancy, patient would like epidural.    Denies previous neuraxial anesthesia.  Denies previous issues with general anesthesia.  Denies family history of issues with anesthesia.  Past Surgical History:   Procedure Laterality Date     SECTION          OB History    Para Term  AB Living   2 1 1     1   SAB TAB Ectopic Multiple Live Births           1      # Outcome Date GA Lbr Yury/2nd Weight Sex Delivery Anes PTL Lv   2 Current            1 Term 14 38w0d  2.863 kg (6 lb 5 oz) M CS-LTranv Gen  JESSICA      Complications: Fetal Intolerance,Gestational diabetes      Birth Comments: NRFS at 8.5cm, body and nuchal cord, GDM - diet           Patient Active Problem List   Diagnosis    Hx CS. OP report in media section - LTCS with 2 layer closure; 17 cleared for TOLAC per [place]    H/o GDM in prior pregnancy; 28 wk 1 hr GTT abnormal w 3 hr GTT WNL    Pre preg BMI 27    Prenatal care in third trimester    Low weight gain during pregnancy in third trimester    Normal labor       Review of patient's allergies indicates:   Allergen Reactions    Sulfa (sulfonamide antibiotics) Hives       Social History     Social History    Marital status:      Spouse name: N/A    Number of children: N/A    Years of education: N/A      Occupational History    Not on file.     Social History Main Topics    Smoking status: Never Smoker    Smokeless tobacco: Never Used    Alcohol use No      Comment: socially before pregnancy    Drug use: No    Sexual activity: Yes     Partners: Male     Birth control/ protection: None     Other Topics Concern    Not on file     Social History Narrative     [employer]    She is SAHM    Son Michele guzmán. Via CS for NRFS at 8.5cm    Second baby for both!       OBJECTIVE:     Outpatient Medications:  No current facility-administered medications on file prior to encounter.      Current Outpatient Prescriptions on File Prior to Encounter   Medication Sig Dispense Refill    loratadine (CLARITIN) 10 mg tablet Take 10 mg by mouth once daily.      PRENATAL 25/IRON FUM/FOLIC/DHA (PRENATAL-1 ORAL) Take by mouth.          Current Inpatient Medications:   carboprost        methylergonovine        miSOPROStol        oxytocin in lactated ringers        bupivacaine (PF) 0.25% (2.5 mg/ml)        fentaNYL        fentanyl 2mcg/mL with bupivacaine 0.1% in sdoium chloride 0.9% Epidural           Wt Readings from Last 1 Encounters:   12/29/17 1428 68.9 kg (151 lb 14.4 oz)       BP Readings from Last 3 Encounters:   12/29/17 112/72   12/19/17 106/70   12/13/17 106/68         Chemistry    No results found for: NA, K, CL, CO2, BUN, CREATININE, GLU No results found for: CALCIUM, ALKPHOS, AST, ALT, BILITOT, ESTGFRAFRICA, EGFRNONAA         Lab Results   Component Value Date    WBC 7.34 10/11/2017    HGB 13.0 10/11/2017    HCT 38.6 10/11/2017    MCV 94 10/11/2017     10/11/2017       No results for input(s): PT, INR, PROTIME, APTT in the last 72 hours.    Anesthesia Evaluation    I have reviewed the Patient Summary Reports.      I have reviewed the Medications.     Review of Systems  Anesthesia Hx:  No problems with previous Anesthesia Denies Hx of Anesthetic complications  History of prior surgery of  interest to airway management or planning: Denies Family Hx of Anesthesia complications.   Denies Personal Hx of Anesthesia complications.   Social:  Non-Smoker, No Alcohol Use    Hematology/Oncology:  Hematology Normal   Oncology Normal     EENT/Dental:EENT/Dental Normal   Cardiovascular:   Exercise tolerance: good Denies Pacemaker.  Denies Hypertension.  Denies Valvular problems/Murmurs.  Denies MI.  Denies CAD.    Denies CABG/stent.  Denies Dysrhythmias.   Denies Angina.         ECG has been reviewed.    Pulmonary:  Pulmonary Normal  Denies COPD.  Denies Asthma.  Denies Sleep Apnea.    Renal/:  Renal/ Normal  Denies Chronic Renal Disease.     Hepatic/GI:  Hepatic/GI Normal  Denies GERD. Denies Liver Disease.    Musculoskeletal:  Musculoskeletal Normal    Neurological:  Neurology Normal  Denies CVA. Denies Seizures.    Endocrine:   Diabetes, well controlled, gestational Denies Hypothyroidism.    Dermatological:  Skin Normal    Psych:  Psychiatric Normal           Physical Exam  General:  Well nourished, Obesity    Airway/Jaw/Neck:  Airway Findings: Mouth Opening: Small, but > 3cm Tongue: Normal  General Airway Assessment: Adult  Mallampati: III  TM Distance: Normal, at least 6 cm  Jaw/Neck Findings:  Neck ROM: Normal ROM  Neck Findings: Normal    Eyes/Ears/Nose:  EYES/EARS/NOSE FINDINGS: Normal   Dental:  Dental Findings: In tact   Chest/Lungs:  Chest/Lungs Findings: Clear to auscultation, Normal Respiratory Rate     Heart/Vascular:  Heart Findings: Rate: Normal  Rhythm: Regular Rhythm  Sounds: Normal  Heart murmur: negative       Mental Status:  Mental Status Findings:  Cooperative, Alert and Oriented         Anesthesia Plan  Type of Anesthesia, risks & benefits discussed:  Anesthesia Type:  CSE, epidural, general, spinal  Patient's Preference:   Intra-op Monitoring Plan:   Intra-op Monitoring Plan Comments:   Post Op Pain Control Plan:   Post Op Pain Control Plan Comments:   Induction:    Beta Blocker:   Patient is not currently on a Beta-Blocker (No further documentation required).       Informed Consent: Patient understands risks and agrees with Anesthesia plan.  Questions answered. Anesthesia consent signed with patient.  ASA Score: 2     Day of Surgery Review of History & Physical:    H&P update referred to the surgeon.         Ready For Surgery From Anesthesia Perspective.

## 2017-12-31 NOTE — PROGRESS NOTES
S: Cydney is a 27 y.o.  at 40w0d. She is doing well. Reports comfortable with epidural.  at bedside    O:   Blood pressure 115/81, pulse 98, temperature 97.7 °F (36.5 °C), temperature source Temporal, resp. rate 20, height 5' (1.524 m), weight 68.9 kg (152 lb), last menstrual period 2017, SpO2 (!) 84 %, not currently breastfeeding.     FHT: Stable baseline with accels, occ variables   Crane/IUPC: q2-4  SVE: C / C / +1      ASSESSMENT: 40w0d   Active Hospital Problems    Diagnosis  POA    Normal labor [O80, Z37.9]  Not Applicable      Resolved Hospital Problems    Diagnosis Date Resolved POA   No resolved problems to display.       PLAN:  Continue Close Maternal/Fetal Monitoring  Will allow to labor down    Charisma Lee CNM

## 2017-12-31 NOTE — PLAN OF CARE
Problem: Patient Care Overview  Goal: Plan of Care Review  Outcome: Ongoing (interventions implemented as appropriate)  To breastfeed baby 8x or more in 24 hours, feed on cue. To practice correct latch and positioning with breast compression during feeding and skin to skin during feeding.  To observe for signs of milk transfer during feeding.  To call for further breastfeeding assistance/ support if needed.    *Blood sugar of baby being monitored, supplement as ordered by MD

## 2018-01-01 VITALS
TEMPERATURE: 98 F | RESPIRATION RATE: 17 BRPM | HEIGHT: 60 IN | DIASTOLIC BLOOD PRESSURE: 72 MMHG | BODY MASS INDEX: 29.84 KG/M2 | OXYGEN SATURATION: 98 % | HEART RATE: 73 BPM | SYSTOLIC BLOOD PRESSURE: 107 MMHG | WEIGHT: 152 LBS

## 2018-01-01 PROBLEM — Z37.9 NORMAL LABOR: Status: RESOLVED | Noted: 2017-12-31 | Resolved: 2018-01-01

## 2018-01-01 PROCEDURE — 72100002 HC LABOR CARE, 1ST 8 HOURS

## 2018-01-01 PROCEDURE — 59400 OBSTETRICAL CARE: CPT | Mod: ,,, | Performed by: OBSTETRICS & GYNECOLOGY

## 2018-01-01 PROCEDURE — 25000003 PHARM REV CODE 250: Performed by: STUDENT IN AN ORGANIZED HEALTH CARE EDUCATION/TRAINING PROGRAM

## 2018-01-01 RX ADMIN — IBUPROFEN 600 MG: 600 TABLET, FILM COATED ORAL at 12:01

## 2018-01-01 RX ADMIN — DOCUSATE SODIUM 200 MG: 100 CAPSULE, LIQUID FILLED ORAL at 07:01

## 2018-01-01 RX ADMIN — IBUPROFEN 600 MG: 600 TABLET, FILM COATED ORAL at 05:01

## 2018-01-01 NOTE — SUBJECTIVE & OBJECTIVE
Hospital course: Cydney Leonard  was a 27 y.o.  female with EDC 17 at 40w0d gestation when she was admitted in active labor for TOLAC. Her labor benefited from an epidural. An episode of bradycardia precipitated a forceps assissted vaginal delivery at 0735 on . A second degree laceration was repaired and her EBL was 225ml. Her postpartum course has been uncomplicated and she is ready for discharge on this, her #1 postpartum day.    Interval History:     She is doing well this morning. She is tolerating a regular diet without nausea or vomiting. She is voiding spontaneously. She is ambulating. She has passed flatus, and has not a BM. Vaginal bleeding is moderate. She denies fever or chills. Abdominal pain is moderate and controlled with oral medications. She is breastfeeding.     Objective:     Vital Signs (Most Recent):  Temp: 98.3 °F (36.8 °C) (18)  Pulse: 73 (18)  Resp: 17 (18)  BP: 107/72 (18)  SpO2: 98 % (18) Vital Signs (24h Range):  Temp:  [97.8 °F (36.6 °C)-98.3 °F (36.8 °C)] 98.3 °F (36.8 °C)  Pulse:  [] 73  Resp:  [17-18] 17  SpO2:  [97 %-100 %] 98 %  BP: (107-134)/(60-80) 107/72     Weight: 68.9 kg (152 lb)  Body mass index is 29.69 kg/m².      Intake/Output Summary (Last 24 hours) at 18 1010  Last data filed at 17 1200   Gross per 24 hour   Intake              825 ml   Output              650 ml   Net              175 ml       Significant Labs:  Lab Results   Component Value Date    GROUPTRH O POS 2017    HEPBSAG Negative 2017    STREPBCULT No Group B Streptococcus isolated 2017       Recent Labs  Lab 17   HGB 11.5*   HCT 33.8*       I have personallly reviewed all pertinent lab results from the last 24 hours.    Physical Exam:       Cardiovascular: Normal rate and regular rhythm.     Pulmonary/Chest: Effort normal. No respiratory distress.        Abdominal: Soft. There is no  tenderness.   Fundus firm at U-2.     Genitourinary:   Genitourinary Comments: Moderate amount rubra lochia.  Vulva/perineum: repair intact.           Musculoskeletal: Normal range of motion. She exhibits no edema or tenderness.       Neurological: She has normal reflexes. She displays normal reflexes. She exhibits normal muscle tone.     Psychiatric: She has a normal mood and affect. Her behavior is normal.

## 2018-01-01 NOTE — PLAN OF CARE
Problem: Patient Care Overview  Goal: Plan of Care Review  Outcome: Ongoing (interventions implemented as appropriate)  To breastfeed baby 8x or more in 24 hours, feed on cue. To practice correct latch and positioning with breast compression during feeding and skin to skin during feeding.  To observe for signs of milk transfer during feeding.  To call for further breastfeeding assistance/ support if needed.  If latch is not effective, hand express colostrum and spoon feed.

## 2018-01-01 NOTE — DISCHARGE SUMMARY
[facility]  Obstetrics  Discharge Summary      Patient Name: Cydney Leonard  MRN: [id number]  Admission Date: 2017  Hospital Length of Stay: 1 days  Discharge Date and Time:  2018 10:14 AM  Attending Physician: Toya Lambert MD   Discharging Provider: Sonia Castrejon CNM  Primary Care Provider: Primary Doctor No    HPI: No notes on file    * No surgery found *     Hospital Course:   Cydney Leonard  was a 27 y.o.  female with EDC 17 at 40w0d gestation when she was admitted in active labor for TOLAC. Her labor benefited from an epidural. An episode of bradycardia precipitated a forceps assissted vaginal delivery at 0735 on . A second degree laceration was repaired and her EBL was 225ml. Her postpartum course has been uncomplicated and she is ready for discharge on this, her #1 postpartum day.        Final Active Diagnoses:    Diagnosis Date Noted POA    PRINCIPAL PROBLEM:  , delivered [O34.219] 2017 No    Forceps delivery with baby delivered [O66.5] 2017 No      Problems Resolved During this Admission:    Diagnosis Date Noted Date Resolved POA    Normal labor [O80, Z37.9] 2017 Not Applicable        Labs:   CBC   Recent Labs  Lab 17  0255 17   WBC 13.13* 12.61   HGB 13.3 11.5*   HCT 39.0 33.8*    134*       Feeding Method: breast    Immunizations     Date Immunization Status Dose Route/Site Given by    17 1106 MMR Incomplete 0.5 mL Subcutaneous/Left deltoid     17 1106 Tdap Incomplete 0.5 mL Intramuscular/Left deltoid           Delivery:    Episiotomy: None   Lacerations: 2nd   Repair suture:     Repair # of packets: 2   Blood loss (ml): 225     Birth information:  YOB: 2015   Time of birth: 7:35 AM   Sex: female   Delivery type: , Forceps   Gestational Age: 40w0d    Delivery Clinician:      Other providers:       Additional  information:  Forceps:    Vacuum:     Breech:    Observed anomalies      Living?:           APGARS  One minute Five minutes Ten minutes   Skin color:         Heart rate:         Grimace:         Muscle tone:         Breathing:         Totals: 3  7  8      Placenta: Delivered:       appearance    Pending Diagnostic Studies:     None          Discharged Condition: good    Disposition:     Follow Up:  Follow-up Information     Sonia Castrejon CNM. Schedule an appointment as soon as possible for a visit in 4 weeks.    Specialty:  Obstetrics and Gynecology  Contact information:  [address] [place] [id number]  [phone]                 Patient Instructions:   No discharge procedures on file.  Medications:  Current Discharge Medication List      CONTINUE these medications which have NOT CHANGED    Details   loratadine (CLARITIN) 10 mg tablet Take 10 mg by mouth once daily.      PRENATAL 25/IRON FUM/FOLIC/DHA (PRENATAL-1 ORAL) Take by mouth.             Sonia Castrejon CNM  Obstetrics  [facility]

## 2018-01-01 NOTE — DISCHARGE INSTRUCTIONS
Breastfeeding Discharge Instructions       Feed the baby at the earliest sign of hunger or comfort  o Hands to mouth, sucking motions  o Rooting or searching for something to suck on  o Dont wait for crying - it is a late sign of hunger and comfort.     The feedings may be 8-12 times per 24hrs and will not follow a schedule   Avoid pacifiers and bottles for the first 4 weeks   Alternate the breast you start the feeding with, or start with the breast that feels the fullest   Switch breasts when the baby takes himself off the breast or falls asleep   Keep offering breasts until the baby looks full, no longer gives hunger signs, and stays asleep when placed on his back in the crib   If the baby is sleepy and wont wake for a feeding, put the baby skin-to-skin dressed in a diaper against the mothers bare chest   Sleep near your baby   The baby should be positioned and latched on to the breast correctly  o Chest-to-chest, chin in the breast  o Babys lips are flipped outward  o Babys mouth is stretched open wide like a shout  o Babys sucking should feel like tugging to the mother  - The baby should be drinking at the breast:  o You should hear swallowing or gulping throughout the feeding  o You should see milk on the babys lips when he comes off the breast  o Your breasts should be softer when the baby is finished feeding  o The baby should look relaxed at the end of feedings  o After the 4th day and your milk is in:  o The babys poop should turn bright yellow and be loose, watery, and seedy  o The baby should have at least 3-4 poops the size of the palm of your hand per day  o The baby should have at least 6-8 wet diapers per day  o The urine should be light yellow in color  You should drink when you are thirsty and eat a healthy diet when you are    hungry.     Take naps to get the rest you need.   Take medications and/or drink alcohol only with permission of your obstetrician    or the babys  pediatrician.  You can also call the [facility],   ([phone]), Monday-Friday, 8am-5pm Central time, to get the most   up-to-date evidence-based information on the use of medications during   pregnancy and breastfeeding.      The baby should be examined by a pediatrician at 3-5 days of age.  Once your   milk comes in, the baby should be gaining at least ½ - 1oz each day and should be back to birthweight no later than 10-14 days of age.

## 2018-01-01 NOTE — LACTATION NOTE
"Seen pt for lactation counseling.  Latch assistance provided.  Baby latched on on right breast with continuous arousal for 15 minutes.  Noted "some audible" swallows.  Encouraged skin to skin with baby.  Gentle back and temporal massages done to help with baby's wakefulness.  Taught hand expression.  Discharge education provided as anticipatory care. Latched baby on left breast but baby suckled just 2 mins and slept.  Aroused but not sucking, noted little spit up.  Encouraged pt to burp and skin to skin care first and offer left breast on cue after skin to skin.  Support and encouragement offered. Pt verbalized understanding and LC number on board if further assistance is needed.      01/01/18 0840   Maternal Infant Assessment   Breast Shape pendulous   Breast Density soft   Areola elastic   Nipple(s) graspable   Infant Assessment   Sucking Reflex present   Rooting Reflex present   Swallow Reflex present   LATCH Score   Latch 1-->repeated attempts, holds nipple in mouth, stimulate to suck   Audible Swallowing 1-->a few with stimulation   Type Of Nipple 2-->everted (after stimulation)   Comfort (Breast/Nipple) 2-->soft/nontender   Hold (Positioning) 1-->minimal assist, teach one side: mother does other, staff holds   Score (less than 7 for 2/more consecutive times, consult Lactation Consultant) 7       Number Scale   Presence of Pain denies   Location nipple(s)   Maternal Infant Feeding   Maternal Emotional State assist needed   Infant Positioning other (see comments)  (modified ventral-cradle hold)   Time Spent (min) 15-30 min   Nipple Shape After Feeding, Right with compression line   Breastfeeding Education adequate infant intake;importance of skin-to-skin contact;increasing milk supply;milk expression, hand;medication effects;label/storage of breast milk;other (see comments)  (discharge)   Feeding Infant   Satiety Cues calm after feeding;cessation of sucking   Feeding Tolerance/Success sleepy;arousal required "   Effective Latch During Feeding yes   Audible Swallow other (see comments)  (some)   Suck/Swallow Coordination other (see comments)  ( a bit jittery/ some uncoordinated)   Lactation Referrals   Lactation Consult Breastfeeding assessment;Initial assessment   Lactation Interventions   Attachment Promotion breastfeeding assistance provided;counseling provided;skin-to-skin contact encouraged   Breastfeeding Assistance assisted with positioning;support offered   Maternal Breastfeeding Support lactation counseling provided   Latch Promotion positioning assisted;suck stimulated with colostrum drop

## 2018-01-01 NOTE — HOSPITAL COURSE
Cydney Leonard was a 27 y.o.  female with EDC 17 at 40w0d gestation when she was admitted in active labor for TOLAC. Her labor benefited from an epidural. An episode of bradycardia precipitated a forceps assissted vaginal delivery at 0735 on . A second degree laceration was repaired and her EBL was 225ml. Her postpartum course has been uncomplicated and she is ready for discharge on this, her #1 postpartum day.

## 2018-01-01 NOTE — ANESTHESIA POSTPROCEDURE EVALUATION
Anesthesia Post Evaluation    Patient: Cydney Preciadofield    Procedure(s) Performed: * No procedures listed *    Final Anesthesia Type: epidural  Patient location during evaluation: labor & delivery  Patient participation: Yes- Able to Participate  Level of consciousness: awake and alert and oriented  Post-procedure vital signs: reviewed and stable  Pain management: adequate  Airway patency: patent  PONV status at discharge: No PONV  Anesthetic complications: no      Cardiovascular status: blood pressure returned to baseline and hemodynamically stable  Respiratory status: spontaneous ventilation, unassisted and room air  Hydration status: euvolemic  Follow-up not needed.        Visit Vitals  /72 (BP Location: Right arm, Patient Position: Sitting)   Pulse 73   Temp 36.8 °C (98.3 °F) (Oral)   Resp 17   Ht 5' (1.524 m)   Wt 68.9 kg (152 lb)   LMP 03/26/2017 (Exact Date)   SpO2 98%   Breastfeeding? Yes   BMI 29.69 kg/m²       Pain/Kerline Score: Pain Rating Prior to Med Admin: 0 (1/1/2018  5:52 AM)  Pain Rating Post Med Admin: 2 (12/31/2017  5:45 PM)

## 2018-01-03 ENCOUNTER — TELEPHONE (OUTPATIENT)
Dept: LACTATION | Facility: CLINIC | Age: 28
End: 2018-01-03

## 2018-01-03 NOTE — TELEPHONE ENCOUNTER
Follow up phone call. Left voice mail for pt to call lc with any breastfeeding concerns or questions.

## 2018-01-04 ENCOUNTER — TELEPHONE (OUTPATIENT)
Dept: LACTATION | Facility: CLINIC | Age: 28
End: 2018-01-04

## 2018-01-04 NOTE — TELEPHONE ENCOUNTER
Lactation note: left message of follow up lactation call and lactation warmline phone # on voice mail;

## 2018-02-15 ENCOUNTER — POSTPARTUM VISIT (OUTPATIENT)
Dept: OBSTETRICS AND GYNECOLOGY | Facility: CLINIC | Age: 28
End: 2018-02-15
Payer: OTHER GOVERNMENT

## 2018-02-15 VITALS
WEIGHT: 130.63 LBS | HEIGHT: 60 IN | SYSTOLIC BLOOD PRESSURE: 96 MMHG | BODY MASS INDEX: 25.64 KG/M2 | DIASTOLIC BLOOD PRESSURE: 58 MMHG

## 2018-02-15 DIAGNOSIS — Z78.9 BREASTFEEDING (INFANT): ICD-10-CM

## 2018-02-15 DIAGNOSIS — O34.219 PREGNANCY WITH HISTORY OF CESAREAN SECTION, ANTEPARTUM: Primary | ICD-10-CM

## 2018-02-15 PROCEDURE — 0503F POSTPARTUM CARE VISIT: CPT | Mod: ,,, | Performed by: ADVANCED PRACTICE MIDWIFE

## 2018-02-15 PROCEDURE — 99999 PR PBB SHADOW E&M-EST. PATIENT-LVL III: CPT | Mod: PBBFAC,,, | Performed by: ADVANCED PRACTICE MIDWIFE

## 2018-02-15 PROCEDURE — 99213 OFFICE O/P EST LOW 20 MIN: CPT | Mod: PBBFAC | Performed by: ADVANCED PRACTICE MIDWIFE

## 2018-02-15 NOTE — PROGRESS NOTES
CC: Post-partum follow-up    Cydney Leonard is a 27 y.o. female  who presents for post-partum visit.  She is S/P a Forceps delivery/ with 2 degree laceration. She and the baby are doing well.  No pain.  No fever.   No bowel / bladder complaints. Breastfeeding is going well. Had intercourse once last week; states it was just a little uncomfortable. Bleeding stopped about 2 weeks ago. Occasional spotting only since then. Pap 2017; knows the recommendation is to repeat the pap in three years.    Breast Feeding: YES  Depression: NO, has a PPD score of 4  Contraception: NFP for now. He is considering a vasectomy. Options for BCM reviewed. Declines at this time.    LMP 2017 (Exact Date)     ROS:  GENERAL: No fever, chills, fatigability.  VULVAR: No pain, no lesions and no itching.  VAGINAL: No relaxation, no itching, no discharge, no abnormal bleeding and no lesions.  ABDOMEN: No abdominal pain. Denies nausea. Denies vomiting. No diarrhea. No constipation  BREAST: Denies pain. No lumps. No discharge.  URINARY: No incontinence, no nocturia, no frequency and no dysuria.  CARDIOVASCULAR: No chest pain. No shortness of breath. No leg cramps.  NEUROLOGICAL: No headaches. No vision changes.    PHYSICAL EXAM:  ABDOMEN:  Soft, non-tender, non-distended  VULVA:  Normal, no lesions  PERINEUM: Repair well healed  CERVIX:  Without lesions, polyps or tenderness.  UTERUS:  Normal size, shape, consistency, no mass or tenderness.  ADNEXA:  Normal in size without mass or tenderness    IMP:  Normal postpartum involution  Instructions / precautions reviewed  Contraceptive counseling      PLAN:  May resume normal activities    No Follow-up on file.

## 2018-09-24 PROBLEM — O09.299 HISTORY OF GESTATIONAL DIABETES IN PRIOR PREGNANCY, CURRENTLY PREGNANT: Status: RESOLVED | Noted: 2017-05-17 | Resolved: 2018-09-24

## 2018-09-24 PROBLEM — Z86.32 HISTORY OF GESTATIONAL DIABETES IN PRIOR PREGNANCY, CURRENTLY PREGNANT: Status: RESOLVED | Noted: 2017-05-17 | Resolved: 2018-09-24
